# Patient Record
Sex: MALE | Race: BLACK OR AFRICAN AMERICAN | Employment: OTHER | URBAN - METROPOLITAN AREA
[De-identification: names, ages, dates, MRNs, and addresses within clinical notes are randomized per-mention and may not be internally consistent; named-entity substitution may affect disease eponyms.]

---

## 2021-10-11 ENCOUNTER — APPOINTMENT (OUTPATIENT)
Dept: GENERAL RADIOLOGY | Facility: CLINIC | Age: 76
DRG: 261 | End: 2021-10-11
Payer: MEDICARE

## 2021-10-11 ENCOUNTER — APPOINTMENT (OUTPATIENT)
Dept: CT IMAGING | Facility: CLINIC | Age: 76
DRG: 261 | End: 2021-10-11
Payer: MEDICARE

## 2021-10-11 ENCOUNTER — HOSPITAL ENCOUNTER (INPATIENT)
Age: 76
LOS: 2 days | Discharge: HOME | DRG: 261 | End: 2021-10-13
Attending: EMERGENCY MEDICINE | Admitting: INTERNAL MEDICINE
Payer: MEDICARE

## 2021-10-11 DIAGNOSIS — R00.1 BRADYCARDIA: ICD-10-CM

## 2021-10-11 DIAGNOSIS — R55 SYNCOPE AND COLLAPSE: Primary | ICD-10-CM

## 2021-10-11 PROBLEM — E78.5 HYPERLIPEMIA: Status: ACTIVE | Noted: 2021-10-11

## 2021-10-11 PROBLEM — I10 HYPERTENSION: Status: ACTIVE | Noted: 2021-10-11

## 2021-10-11 LAB
ABSOLUTE EOS #: 0.2 K/UL (ref 0–0.4)
ABSOLUTE IMMATURE GRANULOCYTE: ABNORMAL K/UL (ref 0–0.3)
ABSOLUTE LYMPH #: 2.8 K/UL (ref 1–4.8)
ABSOLUTE MONO #: 0.6 K/UL (ref 0.1–1.2)
ALBUMIN SERPL-MCNC: 4 G/DL (ref 3.5–5.2)
ALBUMIN/GLOBULIN RATIO: 1.3 (ref 1–2.5)
ALP BLD-CCNC: 62 U/L (ref 40–129)
ALT SERPL-CCNC: 10 U/L (ref 5–41)
AMYLASE: 88 U/L (ref 28–100)
ANION GAP SERPL CALCULATED.3IONS-SCNC: 11 MMOL/L (ref 9–17)
AST SERPL-CCNC: 15 U/L
BASOPHILS # BLD: 1 % (ref 0–2)
BASOPHILS ABSOLUTE: 0 K/UL (ref 0–0.2)
BILIRUB SERPL-MCNC: 0.4 MG/DL (ref 0.3–1.2)
BILIRUBIN URINE: NEGATIVE
BNP INTERPRETATION: NORMAL
BUN BLDV-MCNC: 14 MG/DL (ref 8–23)
BUN/CREAT BLD: ABNORMAL (ref 9–20)
CALCIUM SERPL-MCNC: 9.2 MG/DL (ref 8.6–10.4)
CHLORIDE BLD-SCNC: 104 MMOL/L (ref 98–107)
CO2: 26 MMOL/L (ref 20–31)
COLOR: YELLOW
COMMENT UA: NORMAL
CREAT SERPL-MCNC: 1.2 MG/DL (ref 0.7–1.2)
DIFFERENTIAL TYPE: ABNORMAL
EKG ATRIAL RATE: 62 BPM
EKG P AXIS: 71 DEGREES
EKG P-R INTERVAL: 186 MS
EKG Q-T INTERVAL: 442 MS
EKG QRS DURATION: 78 MS
EKG QTC CALCULATION (BAZETT): 448 MS
EKG R AXIS: 41 DEGREES
EKG T AXIS: 49 DEGREES
EKG VENTRICULAR RATE: 62 BPM
EOSINOPHILS RELATIVE PERCENT: 4 % (ref 1–4)
GFR AFRICAN AMERICAN: >60 ML/MIN
GFR NON-AFRICAN AMERICAN: 59 ML/MIN
GFR SERPL CREATININE-BSD FRML MDRD: ABNORMAL ML/MIN/{1.73_M2}
GFR SERPL CREATININE-BSD FRML MDRD: ABNORMAL ML/MIN/{1.73_M2}
GLUCOSE BLD-MCNC: 139 MG/DL (ref 70–99)
GLUCOSE URINE: NEGATIVE
HCT VFR BLD CALC: 47.4 % (ref 41–53)
HEMOGLOBIN: 15.2 G/DL (ref 13.5–17.5)
IMMATURE GRANULOCYTES: ABNORMAL %
INR BLD: 1
KETONES, URINE: NEGATIVE
LEUKOCYTE ESTERASE, URINE: NEGATIVE
LIPASE: 25 U/L (ref 13–60)
LYMPHOCYTES # BLD: 49 % (ref 24–44)
MCH RBC QN AUTO: 26.5 PG (ref 26–34)
MCHC RBC AUTO-ENTMCNC: 32.1 G/DL (ref 31–37)
MCV RBC AUTO: 82.7 FL (ref 80–100)
MONOCYTES # BLD: 10 % (ref 2–11)
NITRITE, URINE: NEGATIVE
NRBC AUTOMATED: ABNORMAL PER 100 WBC
PARTIAL THROMBOPLASTIN TIME: 21.3 SEC (ref 21.3–31.3)
PDW BLD-RTO: 14.2 % (ref 12.5–15.4)
PH UA: 6 (ref 5–8)
PLATELET # BLD: 195 K/UL (ref 140–450)
PLATELET ESTIMATE: ABNORMAL
PMV BLD AUTO: 7.6 FL (ref 6–12)
POTASSIUM SERPL-SCNC: 3.7 MMOL/L (ref 3.7–5.3)
PRO-BNP: 112 PG/ML
PROTEIN UA: NEGATIVE
PROTHROMBIN TIME: 10.4 SEC (ref 9.4–12.6)
RBC # BLD: 5.74 M/UL (ref 4.5–5.9)
RBC # BLD: ABNORMAL 10*6/UL
SEG NEUTROPHILS: 36 % (ref 36–66)
SEGMENTED NEUTROPHILS ABSOLUTE COUNT: 2.1 K/UL (ref 1.8–7.7)
SODIUM BLD-SCNC: 141 MMOL/L (ref 135–144)
SPECIFIC GRAVITY UA: 1.02 (ref 1–1.03)
TOTAL PROTEIN: 7.1 G/DL (ref 6.4–8.3)
TROPONIN INTERP: NORMAL
TROPONIN INTERP: NORMAL
TROPONIN T: NORMAL NG/ML
TROPONIN T: NORMAL NG/ML
TROPONIN, HIGH SENSITIVITY: 11 NG/L (ref 0–22)
TROPONIN, HIGH SENSITIVITY: 13 NG/L (ref 0–22)
TURBIDITY: CLEAR
URINE HGB: NEGATIVE
UROBILINOGEN, URINE: NORMAL
WBC # BLD: 5.7 K/UL (ref 3.5–11)
WBC # BLD: ABNORMAL 10*3/UL

## 2021-10-11 PROCEDURE — 99285 EMERGENCY DEPT VISIT HI MDM: CPT

## 2021-10-11 PROCEDURE — 83690 ASSAY OF LIPASE: CPT

## 2021-10-11 PROCEDURE — 6360000002 HC RX W HCPCS: Performed by: EMERGENCY MEDICINE

## 2021-10-11 PROCEDURE — 96374 THER/PROPH/DIAG INJ IV PUSH: CPT

## 2021-10-11 PROCEDURE — 82150 ASSAY OF AMYLASE: CPT

## 2021-10-11 PROCEDURE — 80053 COMPREHEN METABOLIC PANEL: CPT

## 2021-10-11 PROCEDURE — 6370000000 HC RX 637 (ALT 250 FOR IP): Performed by: NURSE PRACTITIONER

## 2021-10-11 PROCEDURE — 84484 ASSAY OF TROPONIN QUANT: CPT

## 2021-10-11 PROCEDURE — 85025 COMPLETE CBC W/AUTO DIFF WBC: CPT

## 2021-10-11 PROCEDURE — 36415 COLL VENOUS BLD VENIPUNCTURE: CPT

## 2021-10-11 PROCEDURE — APPSS45 APP SPLIT SHARED TIME 31-45 MINUTES: Performed by: NURSE PRACTITIONER

## 2021-10-11 PROCEDURE — 99222 1ST HOSP IP/OBS MODERATE 55: CPT | Performed by: NURSE PRACTITIONER

## 2021-10-11 PROCEDURE — 70450 CT HEAD/BRAIN W/O DYE: CPT

## 2021-10-11 PROCEDURE — 85730 THROMBOPLASTIN TIME PARTIAL: CPT

## 2021-10-11 PROCEDURE — 71045 X-RAY EXAM CHEST 1 VIEW: CPT

## 2021-10-11 PROCEDURE — 85610 PROTHROMBIN TIME: CPT

## 2021-10-11 PROCEDURE — 93005 ELECTROCARDIOGRAM TRACING: CPT | Performed by: EMERGENCY MEDICINE

## 2021-10-11 PROCEDURE — 6360000002 HC RX W HCPCS: Performed by: NURSE PRACTITIONER

## 2021-10-11 PROCEDURE — 2060000000 HC ICU INTERMEDIATE R&B

## 2021-10-11 PROCEDURE — 81003 URINALYSIS AUTO W/O SCOPE: CPT

## 2021-10-11 PROCEDURE — 83880 ASSAY OF NATRIURETIC PEPTIDE: CPT

## 2021-10-11 PROCEDURE — 2580000003 HC RX 258: Performed by: NURSE PRACTITIONER

## 2021-10-11 RX ORDER — ATORVASTATIN CALCIUM 20 MG/1
20 TABLET, FILM COATED ORAL DAILY
Status: DISCONTINUED | OUTPATIENT
Start: 2021-10-11 | End: 2021-10-13 | Stop reason: HOSPADM

## 2021-10-11 RX ORDER — POTASSIUM CHLORIDE 7.45 MG/ML
10 INJECTION INTRAVENOUS PRN
Status: DISCONTINUED | OUTPATIENT
Start: 2021-10-11 | End: 2021-10-13 | Stop reason: HOSPADM

## 2021-10-11 RX ORDER — SIMVASTATIN 40 MG
40 TABLET ORAL NIGHTLY
COMMUNITY

## 2021-10-11 RX ORDER — SILDENAFIL 100 MG/1
100 TABLET, FILM COATED ORAL PRN
Status: ON HOLD | COMMUNITY
End: 2021-10-13 | Stop reason: HOSPADM

## 2021-10-11 RX ORDER — ONDANSETRON 4 MG/1
4 TABLET, ORALLY DISINTEGRATING ORAL EVERY 8 HOURS PRN
Status: DISCONTINUED | OUTPATIENT
Start: 2021-10-11 | End: 2021-10-13 | Stop reason: HOSPADM

## 2021-10-11 RX ORDER — LISINOPRIL 40 MG/1
40 TABLET ORAL DAILY
Status: DISCONTINUED | OUTPATIENT
Start: 2021-10-11 | End: 2021-10-13 | Stop reason: HOSPADM

## 2021-10-11 RX ORDER — SODIUM CHLORIDE 9 MG/ML
INJECTION, SOLUTION INTRAVENOUS CONTINUOUS
Status: DISCONTINUED | OUTPATIENT
Start: 2021-10-11 | End: 2021-10-11

## 2021-10-11 RX ORDER — SODIUM CHLORIDE 0.9 % (FLUSH) 0.9 %
10 SYRINGE (ML) INJECTION PRN
Status: DISCONTINUED | OUTPATIENT
Start: 2021-10-11 | End: 2021-10-13 | Stop reason: HOSPADM

## 2021-10-11 RX ORDER — CARVEDILOL 25 MG/1
25 TABLET ORAL 2 TIMES DAILY WITH MEALS
Status: DISCONTINUED | OUTPATIENT
Start: 2021-10-11 | End: 2021-10-13 | Stop reason: HOSPADM

## 2021-10-11 RX ORDER — TRAMADOL HYDROCHLORIDE 50 MG/1
50 TABLET ORAL ONCE
Status: COMPLETED | OUTPATIENT
Start: 2021-10-11 | End: 2021-10-11

## 2021-10-11 RX ORDER — LANOLIN ALCOHOL/MO/W.PET/CERES
1000 CREAM (GRAM) TOPICAL DAILY
COMMUNITY

## 2021-10-11 RX ORDER — POTASSIUM CHLORIDE 20 MEQ/1
40 TABLET, EXTENDED RELEASE ORAL PRN
Status: DISCONTINUED | OUTPATIENT
Start: 2021-10-11 | End: 2021-10-13 | Stop reason: HOSPADM

## 2021-10-11 RX ORDER — ONDANSETRON 2 MG/ML
4 INJECTION INTRAMUSCULAR; INTRAVENOUS ONCE
Status: COMPLETED | OUTPATIENT
Start: 2021-10-11 | End: 2021-10-11

## 2021-10-11 RX ORDER — CARVEDILOL 25 MG/1
25 TABLET ORAL 2 TIMES DAILY WITH MEALS
Status: ON HOLD | COMMUNITY
End: 2021-10-13 | Stop reason: HOSPADM

## 2021-10-11 RX ORDER — ACETAMINOPHEN 325 MG/1
650 TABLET ORAL EVERY 6 HOURS PRN
Status: DISCONTINUED | OUTPATIENT
Start: 2021-10-11 | End: 2021-10-13 | Stop reason: HOSPADM

## 2021-10-11 RX ORDER — ONDANSETRON 2 MG/ML
4 INJECTION INTRAMUSCULAR; INTRAVENOUS EVERY 6 HOURS PRN
Status: DISCONTINUED | OUTPATIENT
Start: 2021-10-11 | End: 2021-10-13 | Stop reason: HOSPADM

## 2021-10-11 RX ORDER — LISINOPRIL 40 MG/1
40 TABLET ORAL DAILY
COMMUNITY

## 2021-10-11 RX ORDER — SODIUM CHLORIDE 9 MG/ML
25 INJECTION, SOLUTION INTRAVENOUS PRN
Status: DISCONTINUED | OUTPATIENT
Start: 2021-10-11 | End: 2021-10-13 | Stop reason: HOSPADM

## 2021-10-11 RX ORDER — ACETAMINOPHEN 650 MG/1
650 SUPPOSITORY RECTAL EVERY 6 HOURS PRN
Status: DISCONTINUED | OUTPATIENT
Start: 2021-10-11 | End: 2021-10-13 | Stop reason: HOSPADM

## 2021-10-11 RX ORDER — SODIUM CHLORIDE 0.9 % (FLUSH) 0.9 %
5-40 SYRINGE (ML) INJECTION EVERY 12 HOURS SCHEDULED
Status: DISCONTINUED | OUTPATIENT
Start: 2021-10-11 | End: 2021-10-13 | Stop reason: HOSPADM

## 2021-10-11 RX ORDER — NICOTINE 21 MG/24HR
1 PATCH, TRANSDERMAL 24 HOURS TRANSDERMAL DAILY
Status: DISCONTINUED | OUTPATIENT
Start: 2021-10-11 | End: 2021-10-13 | Stop reason: HOSPADM

## 2021-10-11 RX ORDER — MAGNESIUM SULFATE 1 G/100ML
1000 INJECTION INTRAVENOUS PRN
Status: DISCONTINUED | OUTPATIENT
Start: 2021-10-11 | End: 2021-10-13 | Stop reason: HOSPADM

## 2021-10-11 RX ORDER — POLYETHYLENE GLYCOL 3350 17 G/17G
17 POWDER, FOR SOLUTION ORAL DAILY PRN
Status: DISCONTINUED | OUTPATIENT
Start: 2021-10-11 | End: 2021-10-13 | Stop reason: HOSPADM

## 2021-10-11 RX ADMIN — ONDANSETRON 4 MG: 2 INJECTION INTRAMUSCULAR; INTRAVENOUS at 05:27

## 2021-10-11 RX ADMIN — LISINOPRIL 40 MG: 40 TABLET ORAL at 15:39

## 2021-10-11 RX ADMIN — ENOXAPARIN SODIUM 40 MG: 40 INJECTION SUBCUTANEOUS at 13:52

## 2021-10-11 RX ADMIN — TRAMADOL HYDROCHLORIDE 50 MG: 50 TABLET ORAL at 22:33

## 2021-10-11 RX ADMIN — ATORVASTATIN CALCIUM 20 MG: 20 TABLET, FILM COATED ORAL at 15:39

## 2021-10-11 RX ADMIN — ACETAMINOPHEN 650 MG: 325 TABLET ORAL at 19:10

## 2021-10-11 RX ADMIN — SODIUM CHLORIDE, PRESERVATIVE FREE 10 ML: 5 INJECTION INTRAVENOUS at 20:17

## 2021-10-11 ASSESSMENT — ENCOUNTER SYMPTOMS
NAUSEA: 1
DIARRHEA: 0
EYE REDNESS: 0
SORE THROAT: 0
COUGH: 0
WHEEZING: 0
ABDOMINAL PAIN: 0
SHORTNESS OF BREATH: 0
EYE DISCHARGE: 0
EYE PAIN: 0
ABDOMINAL DISTENTION: 0
COLOR CHANGE: 0
SINUS PRESSURE: 0
PHOTOPHOBIA: 0
VOMITING: 0
SINUS PAIN: 0
STRIDOR: 0
CONSTIPATION: 0

## 2021-10-11 ASSESSMENT — PAIN SCALES - GENERAL
PAINLEVEL_OUTOF10: 0
PAINLEVEL_OUTOF10: 6
PAINLEVEL_OUTOF10: 6
PAINLEVEL_OUTOF10: 0

## 2021-10-11 NOTE — ED NOTES
Maynard Oppenheim NP return page, speaking with Dr. Mirella Sanchez.        Kandy Patel RN  10/11/21 8807

## 2021-10-11 NOTE — PROGRESS NOTES
Patient admitted to floor via stretcher from [facility] ER. Patient alert and oriented, but seems somewhat confused. Pt. is able to state year, President,t and month, but hard focusing on what meds he is on and what happened prior to his admission. He states that his wife Wendy Andre handles most of his affairs. Patient came with no belongings other than his gown. Patient was in [facility] and after became drinking alcoholically. Approx. 11/2 years ago started AA and has been sober since. He smokes a pack a cigarettes per day. He has no cough and lungs are clear. Patient in sinus lyn on monitor and currently asymptomatic with sats at 99 on room air. Patient does see the [place] for all medical needs and recently had testing done for his increase confusion with a CT scan that was normal and was to f/u in [place] for an MRI. Patient wife, Wendy Andre at bedside. Her number is [phone]  Wife states Pt.  Has a history of an MI with stent  approx 12 years ago

## 2021-10-11 NOTE — H&P
[facility]  Office: [address], DO, Zandra Edmondson, DO, Gladis Seay, DO, [facility], DO, Armando Roa MD, Amaris Lr MD, Nadir Mendez MD, Jana Milton MD, Jessica Hung MD, Viv Ray MD, Meri Hansen MD, Gabriel Irvin, DO, Anne Garza, DO, Wendy Lopez MD,  James Dawn DO, Aidee Acosta MD, Masood Delaney MD, Luis Fernando Minaya MD, Greyson Rand MD, Laron Flores MD, Lois Gunderson MD, [facility], [facility] Noem[facility], CNP, Jess Chamberlain, CNP, Lizeth Gutierrez, CNS, Crystal Suero, CNP, Rhona Roberts, CNP, Shari Morris, CNP, Macrina Capone, CNP, Anna Mccloud, CNP, Abhishek Perez PA-C, Jermaine Carpio, [facility], Nuha Eason, CNP, Jenifer Leroy, CNP, Alena Sunshine, CNP, Yosi Collpatric, CNP, Hayley Damian, CNP, Horacio Stockton, CNP, Rosalie Avery, [facility] 97    HISTORY AND PHYSICAL EXAMINATION            Date:   10/11/2021  Patient name:  Chuy Acevedo  Date of admission:  10/11/2021  4:46 AM  MRN:   [id number]  Account:  [de-identified]  YOB: 1945  PCP:    No primary care provider on file. Room:   [address] [place] [address]  Code Status:    Full Code    Chief Complaint:     Chief Complaint   Patient presents with    Dizziness       History Obtained From:     patient    History of Present Illness:     Chuy Acevedo is a 78 y.o. Non- / non  male who presents with Dizziness   and is admitted to the hospital for the management of Syncope, cardiogenic. Patient reported that over the past 48 hours she has been having a \"empty\" feeling in his chest multiple times has been sitting or lying down and will suddenly become very dizzy and passed out. Patient reports she has never had any symptoms like this before and they have not happened unprovoked. Interview with patient reveals that he has a personal history of hypertension hyperlipidemia with coronary artery disease.   Patient reports that he takes very few medications and is relatively healthy. Patient denies any recent illness or symptomology of any kind. Up until his first syncope episode he had no medical concerns. Patient reported to the emergency department where extensive evaluation was completed. Patient's lab work is unremarkable telemetry monitoring did not capture multiple episodes of profound bradycardia with ventricular escape beats and bigeminy. Patient is admitted for cardiogenic syncope    Past Medical History:     Past Medical History:   Diagnosis Date    CAD (coronary artery disease)     MI with stent approx 12 years        Past Surgical History:     History reviewed. No pertinent surgical history. Medications Prior to Admission:     Prior to Admission medications    Medication Sig Start Date End Date Taking? Authorizing Provider   carvedilol (COREG) 25 MG tablet Take 25 mg by mouth 2 times daily (with meals)   Yes Historical Provider, MD   lisinopril (PRINIVIL;ZESTRIL) 40 MG tablet Take 40 mg by mouth daily   Yes Historical Provider, MD   simvastatin (ZOCOR) 40 MG tablet Take 40 mg by mouth nightly   Yes Historical Provider, MD   sildenafil (VIAGRA) 100 MG tablet Take 100 mg by mouth as needed for Erectile Dysfunction   Yes Historical Provider, MD   vitamin B-12 (CYANOCOBALAMIN) 1000 MCG tablet Take 1,000 mcg by mouth daily   Yes Historical Provider, MD   vitamin D (CHOLECALCIFEROL) 25 MCG (1000 UT) TABS tablet Take 1,000 Units by mouth daily   Yes Historical Provider, MD        Allergies:     Patient has no known allergies. Social History:     Tobacco:    reports that he has been smoking cigarettes. He has been smoking about 1.00 pack per day. He uses smokeless tobacco.  Alcohol:      reports previous alcohol use. Drug Use:  reports no history of drug use. Family History:     History reviewed. No pertinent family history. Review of Systems:     Positive and Negative as described in HPI.     Review of Systems Constitutional: Negative for fever and unexpected weight change. HENT: Negative for sinus pressure and sinus pain. Eyes: Negative for photophobia and visual disturbance. Respiratory: Negative for shortness of breath and wheezing. Cardiovascular: Negative for chest pain and palpitations. Gastrointestinal: Negative for abdominal distention and abdominal pain. Endocrine: Negative for cold intolerance and heat intolerance. Genitourinary: Negative for difficulty urinating and urgency. Musculoskeletal: Negative for arthralgias and myalgias. Skin: Negative for color change, pallor and rash. Allergic/Immunologic: Negative for environmental allergies and immunocompromised state. Neurological: Positive for syncope and weakness. Psychiatric/Behavioral: Negative for agitation, confusion, self-injury and suicidal ideas. The patient is not nervous/anxious. Physical Exam:   /70   Pulse 52   Temp 98.1 °F (36.7 °C) (Oral)   Resp 16   Ht 6' 3\" (1.905 m)   Wt 184 lb 11.2 oz (83.8 kg)   SpO2 98%   BMI 23.09 kg/m²   Temp (24hrs), Av.3 °F (36.8 °C), Min:98.1 °F (36.7 °C), Max:98.5 °F (36.9 °C)    No results for input(s): POCGLU in the last 72 hours. Intake/Output Summary (Last 24 hours) at 10/11/2021 1445  Last data filed at 10/11/2021 1332  Gross per 24 hour   Intake 240 ml   Output 500 ml   Net -260 ml       Physical Exam  Constitutional:       Appearance: Normal appearance. He is normal weight. HENT:      Head: Normocephalic and atraumatic. Mouth/Throat:      Mouth: Mucous membranes are moist.   Eyes:      Extraocular Movements: Extraocular movements intact. Pupils: Pupils are equal, round, and reactive to light. Cardiovascular:      Rate and Rhythm: Bradycardia present. Rhythm irregular. Heart sounds: Murmur heard. Pulmonary:      Effort: Pulmonary effort is normal. No respiratory distress. Breath sounds: Normal breath sounds.    Abdominal: General: Abdomen is flat. Bowel sounds are normal. There is no distension. Palpations: Abdomen is soft. Tenderness: There is no abdominal tenderness. Genitourinary:     Penis: Normal.    Musculoskeletal:         General: Tenderness present. No swelling or deformity. Cervical back: Normal range of motion and neck supple. No rigidity or tenderness. Skin:     Capillary Refill: Capillary refill takes 2 to 3 seconds. Coloration: Skin is not jaundiced. Findings: No bruising or lesion. Neurological:      General: No focal deficit present. Mental Status: He is alert and oriented to person, place, and time. Cranial Nerves: No cranial nerve deficit. Motor: No weakness.    Psychiatric:         Mood and Affect: Mood normal.         Behavior: Behavior normal.         Investigations:      Laboratory Testing:  Recent Results (from the past 24 hour(s))   EKG 12 Lead    Collection Time: 10/11/21  4:49 AM   Result Value Ref Range    Ventricular Rate 62 BPM    Atrial Rate 62 BPM    P-R Interval 186 ms    QRS Duration 78 ms    Q-T Interval 442 ms    QTc Calculation (Bazett) 448 ms    P Axis 71 degrees    R Axis 41 degrees    T Axis 49 degrees   CBC Auto Differential    Collection Time: 10/11/21  4:50 AM   Result Value Ref Range    WBC 5.7 3.5 - 11.0 k/uL    RBC 5.74 4.5 - 5.9 m/uL    Hemoglobin 15.2 13.5 - 17.5 g/dL    Hematocrit 47.4 41 - 53 %    MCV 82.7 80 - 100 fL    MCH 26.5 26 - 34 pg    MCHC 32.1 31 - 37 g/dL    RDW 14.2 12.5 - 15.4 %    Platelets 570 529 - 134 k/uL    MPV 7.6 6.0 - 12.0 fL    NRBC Automated NOT REPORTED per 100 WBC    Differential Type NOT REPORTED     Seg Neutrophils 36 36 - 66 %    Lymphocytes 49 (H) 24 - 44 %    Monocytes 10 2 - 11 %    Eosinophils % 4 1 - 4 %    Basophils 1 0 - 2 %    Immature Granulocytes NOT REPORTED 0 %    Segs Absolute 2.10 1.8 - 7.7 k/uL    Absolute Lymph # 2.80 1.0 - 4.8 k/uL    Absolute Mono # 0.60 0.1 - 1.2 k/uL    Absolute Eos # 0.20 0.0 - 0.4 k/uL    Basophils Absolute 0.00 0.0 - 0.2 k/uL    Absolute Immature Granulocyte NOT REPORTED 0.00 - 0.30 k/uL    WBC Morphology NOT REPORTED     RBC Morphology NOT REPORTED     Platelet Estimate NOT REPORTED    Comprehensive Metabolic Panel w/ Reflex to MG    Collection Time: 10/11/21  4:50 AM   Result Value Ref Range    Glucose 139 (H) 70 - 99 mg/dL    BUN 14 8 - 23 mg/dL    CREATININE 1.20 0.70 - 1.20 mg/dL    Bun/Cre Ratio NOT REPORTED 9 - 20    Calcium 9.2 8.6 - 10.4 mg/dL    Sodium 141 135 - 144 mmol/L    Potassium 3.7 3.7 - 5.3 mmol/L    Chloride 104 98 - 107 mmol/L    CO2 26 20 - 31 mmol/L    Anion Gap 11 9 - 17 mmol/L    Alkaline Phosphatase 62 40 - 129 U/L    ALT 10 5 - 41 U/L    AST 15 <40 U/L    Total Bilirubin 0.40 0.3 - 1.2 mg/dL    Total Protein 7.1 6.4 - 8.3 g/dL    Albumin 4.0 3.5 - 5.2 g/dL    Albumin/Globulin Ratio 1.3 1.0 - 2.5    GFR Non- 59 (L) >60 mL/min    GFR African American >60 >60 mL/min    GFR Comment          GFR Staging NOT REPORTED    Lipase    Collection Time: 10/11/21  4:50 AM   Result Value Ref Range    Lipase 25 13 - 60 U/L   Amylase    Collection Time: 10/11/21  4:50 AM   Result Value Ref Range    Amylase 88 28 - 100 U/L   Troponin    Collection Time: 10/11/21  4:50 AM   Result Value Ref Range    Troponin, High Sensitivity 11 0 - 22 ng/L    Troponin T NOT REPORTED <0.03 ng/mL    Troponin Interp NOT REPORTED    Brain Natriuretic Peptide    Collection Time: 10/11/21  4:50 AM   Result Value Ref Range    Pro- <300 pg/mL    BNP Interpretation Pro-BNP Reference Range:    Protime-INR    Collection Time: 10/11/21  4:50 AM   Result Value Ref Range    Protime 10.4 9.4 - 12.6 sec    INR 1.0    APTT    Collection Time: 10/11/21  4:50 AM   Result Value Ref Range    PTT 21.3 21.3 - 31.3 sec   Troponin    Collection Time: 10/11/21  7:25 AM   Result Value Ref Range    Troponin, High Sensitivity 13 0 - 22 ng/L    Troponin T NOT REPORTED <0.03 ng/mL    Troponin Interp NOT REPORTED    Urinalysis Reflex to Culture    Collection Time: 10/11/21 10:32 AM    Specimen: Urine, clean catch   Result Value Ref Range    Color, UA Yellow Yellow    Turbidity UA Clear Clear    Glucose, Ur NEGATIVE NEGATIVE    Bilirubin Urine NEGATIVE NEGATIVE    Ketones, Urine NEGATIVE NEGATIVE    Specific Gravity, UA 1.020 1.005 - 1.030    Urine Hgb NEGATIVE NEGATIVE    pH, UA 6.0 5.0 - 8.0    Protein, UA NEGATIVE NEGATIVE    Urobilinogen, Urine Normal Normal    Nitrite, Urine NEGATIVE NEGATIVE    Leukocyte Esterase, Urine NEGATIVE NEGATIVE    Urinalysis Comments       Microscopic exam not performed based on chemical results unless requested in original order. Urinalysis Comments          Urinalysis Comments       Utilizing a urinalysis as the only screening method to exclude a potential uropathogen can be unreliable in many patient populations. Rapid screening tests are less sensitive than culture and if UTI is a clinical possibility, culture should be considered despite a negative urinalysis. Imaging/Diagnostics:  CT Head WO Contrast    Result Date: 10/11/2021  No acute intracranial abnormality. XR CHEST PORTABLE    Result Date: 10/11/2021  Normal examination. Assessment :      Hospital Problems         Last Modified POA    * (Principal) Syncope, cardiogenic 10/11/2021 Yes    CAD (coronary artery disease) 10/11/2021 Yes    Overview Signed 10/11/2021  2:44 PM by SHOAIB Rodrigez NP     MI with stent approx 12 years         Hyperlipemia 10/11/2021 Yes    Hypertension 10/11/2021 Yes          Plan:     Patient status inpatient in the  Progressive Unit/Step down    1. Cardiogenic syncope secondary to bradycardia with a personal history of coronary disease, hyperlipidemia, hypertension  1. Rate well controlled at this time, no indication for acute intervention  2. Cardiac echo  3.  Radiology consultation for evaluation and diagnostic recommendations      Consultations:   IP CONSULT TO SOCIAL WORK  IP CONSULT TO CARDIOLOGY    Patient is admitted as inpatient status because of co-morbidities listed above, severity of signs and symptoms as outlined, requirement for current medical therapies and most importantly because of direct risk to patient if care not provided in a hospital setting. Expected length of stay > 48 hours. SHOAIB Rothman NP  10/11/2021  2:45 PM    Copy sent to Dr. Borrego [place] primary care provider on file.

## 2021-10-11 NOTE — PROGRESS NOTES
Transitions of Care Pharmacy Service   Medication Review    The patient's list of current home medications has been reviewed. Source(s) of information: patient's spouse, personal med list from [address]      Please feel free to call me with any questions about this encounter. Thank you.     Anne Stevens [facility]   Transitions of Care Pharmacy Service  Phone:  [phone]  Fax: [phone]      Electronically signed by Anne Stevens [facility] on 10/11/2021 at 5:51 PM           Medications Prior to Admission:   carvedilol (COREG) 25 MG tablet, Take 25 mg by mouth 2 times daily (with meals)  lisinopril (PRINIVIL;ZESTRIL) 40 MG tablet, Take 40 mg by mouth daily  simvastatin (ZOCOR) 40 MG tablet, Take 40 mg by mouth nightly  sildenafil (VIAGRA) 100 MG tablet, Take 100 mg by mouth as needed for Erectile Dysfunction  vitamin B-12 (CYANOCOBALAMIN) 1000 MCG tablet, Take 1,000 mcg by mouth daily  vitamin D (CHOLECALCIFEROL) 25 MCG (1000 UT) TABS tablet, Take 1,000 Units by mouth daily

## 2021-10-11 NOTE — PROGRESS NOTES
Pt. Complaining of right side jaw pain. Daughter Sarah Plate in room and said he was to get it looked at the [place]. In examination it appears to be a tooth type pain.  Pt missing a upper molar and has old metal fillings in remaining teeth

## 2021-10-11 NOTE — ED PROVIDER NOTES
[facility] ED  [address]  Phone: [phone]        ADDENDUM:      Care of this patient was assumed from Dr. Damaris Nassar. The patient was seen for Dizziness  . The patient's initial evaluation and plan have been discussed with the prior provider who initially evaluated the patient. Nursing Notes, Past Medical Hx, Past Surgical Hx, Allergies, were all reviewed. PAST MEDICAL HISTORY    has no past medical history on file. SURGICAL HISTORY      has no past surgical history on file. CURRENT MEDICATIONS       Previous Medications    No medications on file       ALLERGIES     has No Known Allergies.       Diagnostic Results         LABS:   Results for orders placed or performed during the hospital encounter of 10/11/21   CBC Auto Differential   Result Value Ref Range    WBC 5.7 3.5 - 11.0 k/uL    RBC 5.74 4.5 - 5.9 m/uL    Hemoglobin 15.2 13.5 - 17.5 g/dL    Hematocrit 47.4 41 - 53 %    MCV 82.7 80 - 100 fL    MCH 26.5 26 - 34 pg    MCHC 32.1 31 - 37 g/dL    RDW 14.2 12.5 - 15.4 %    Platelets 410 458 - 385 k/uL    MPV 7.6 6.0 - 12.0 fL    NRBC Automated NOT REPORTED per 100 WBC    Differential Type NOT REPORTED     Seg Neutrophils 36 36 - 66 %    Lymphocytes 49 (H) 24 - 44 %    Monocytes 10 2 - 11 %    Eosinophils % 4 1 - 4 %    Basophils 1 0 - 2 %    Immature Granulocytes NOT REPORTED 0 %    Segs Absolute 2.10 1.8 - 7.7 k/uL    Absolute Lymph # 2.80 1.0 - 4.8 k/uL    Absolute Mono # 0.60 0.1 - 1.2 k/uL    Absolute Eos # 0.20 0.0 - 0.4 k/uL    Basophils Absolute 0.00 0.0 - 0.2 k/uL    Absolute Immature Granulocyte NOT REPORTED 0.00 - 0.30 k/uL    WBC Morphology NOT REPORTED     RBC Morphology NOT REPORTED     Platelet Estimate NOT REPORTED    Comprehensive Metabolic Panel w/ Reflex to MG   Result Value Ref Range    Glucose 139 (H) 70 - 99 mg/dL    BUN 14 8 - 23 mg/dL    CREATININE 1.20 0.70 - 1.20 mg/dL    Bun/Cre Ratio NOT REPORTED 9 - 20    Calcium 9.2 8.6 - 10.4 mg/dL Sodium 141 135 - 144 mmol/L    Potassium 3.7 3.7 - 5.3 mmol/L    Chloride 104 98 - 107 mmol/L    CO2 26 20 - 31 mmol/L    Anion Gap 11 9 - 17 mmol/L    Alkaline Phosphatase 62 40 - 129 U/L    ALT 10 5 - 41 U/L    AST 15 <40 U/L    Total Bilirubin 0.40 0.3 - 1.2 mg/dL    Total Protein 7.1 6.4 - 8.3 g/dL    Albumin 4.0 3.5 - 5.2 g/dL    Albumin/Globulin Ratio 1.3 1.0 - 2.5    GFR Non- 59 (L) >60 mL/min    GFR African American >60 >60 mL/min    GFR Comment          GFR Staging NOT REPORTED    Lipase   Result Value Ref Range    Lipase 25 13 - 60 U/L   Amylase   Result Value Ref Range    Amylase 88 28 - 100 U/L   Troponin   Result Value Ref Range    Troponin, High Sensitivity 11 0 - 22 ng/L    Troponin T NOT REPORTED <0.03 ng/mL    Troponin Interp NOT REPORTED    Brain Natriuretic Peptide   Result Value Ref Range    Pro- <300 pg/mL    BNP Interpretation Pro-BNP Reference Range:    Protime-INR   Result Value Ref Range    Protime 10.4 9.4 - 12.6 sec    INR 1.0    APTT   Result Value Ref Range    PTT 21.3 21.3 - 31.3 sec   Troponin   Result Value Ref Range    Troponin, High Sensitivity 13 0 - 22 ng/L    Troponin T NOT REPORTED <0.03 ng/mL    Troponin Interp NOT REPORTED    EKG 12 Lead   Result Value Ref Range    Ventricular Rate 62 BPM    Atrial Rate 62 BPM    P-R Interval 186 ms    QRS Duration 78 ms    Q-T Interval 442 ms    QTc Calculation (Bazett) 448 ms    P Axis 71 degrees    R Axis 41 degrees    T Axis 49 degrees       RADIOLOGY:  CT Head WO Contrast   Final Result   No acute intracranial abnormality. XR CHEST PORTABLE   Final Result   Normal examination.              RECENT VITALS:  BP: (!) 156/74, Temp: 98.5 °F (36.9 °C), Pulse: 51, Resp: 16     ED Course     The patient was given the following medications:  Orders Placed This Encounter   Medications    ondansetron (ZOFRAN) injection 4 mg       Medical Decision Making      ED Course as of Oct 11 1102   Mon Oct 11, 2021   9120 Patient second troponin normal, however, he was having some bigeminy here in the emergency department. At this time we are going to admit for further evaluation and treatment    [TS]   0846 Patient's work-up did not reveal any significant abnormalities or changes, however, he did have runs of bigeminy as well as bradycardia with heart rate in the 40s, continue to have some dizziness and at this time I do think he needs to be admitted for further evaluation and treatment. Patient amenable to the plan will contact [facility] hospitalist    [TS]   RORY Chavarria 16 accepting for Dr. Kayode Archer.     [TS]      ED Course User Index  [TS] Lia Mora DO           EMERGENCY DEPARTMENT COURSE:   Vitals:    Vitals:    10/11/21 0446 10/11/21 0448 10/11/21 0715 10/11/21 0810   BP:  (!) 163/84 (!) 149/71 (!) 156/74   Pulse: 62  58 51   Resp: 16  16 16   Temp: 98.5 °F (36.9 °C)      TempSrc: Oral      SpO2: 97%  100% 100%   Weight: 83.8 kg (184 lb 11.2 oz)      Height: 6' 3\" (1.905 m)        -------------------------  BP: (!) 156/74, Temp: 98.5 °F (36.9 °C), Pulse: 51, Resp: 16      RE-EVALUATION:  stable    CONSULTS:  none    PROCEDURES:  None    FINAL IMPRESSION      1. Syncope and collapse    2. Bradycardia          DISPOSITION/PLAN   DISPOSITION Decision To Admit 10/11/2021 08:46:54 AM      CONDITION ON DISPOSITION:   Stable    PATIENT REFERRED TO:  No follow-up provider specified.     DISCHARGE MEDICATIONS:  New Prescriptions    No medications on file       (Please note that portions of this note were completed with a voicerecognition program.  Efforts were made to edit the dictations but occasionally words are mis-transcribed.)    Lia Mora DO  Attending Emergency Medicine Physician                     Lia Mora DO  10/11/21 3283

## 2021-10-11 NOTE — ED NOTES
[facility] RN paging hospitalist at [phone] for admission.       Hussein Mora RN  10/11/21 7696

## 2021-10-11 NOTE — PLAN OF CARE
Problem: Cardiac:  Goal: Ability to maintain vital signs within normal range will improve  Description: Ability to maintain vital signs within normal range will improve  Outcome: Ongoing  Goal: Cardiovascular alteration will improve  Description: Cardiovascular alteration will improve  Outcome: Ongoing     Problem: Physical Regulation:  Goal: Complications related to the disease process, condition or treatment will be avoided or minimized  Description: Complications related to the disease process, condition or treatment will be avoided or minimized  Outcome: Ongoing     Pt. Had syncope episode at home and became diaphoretic and had diarrhea. O2 sats at 98 on room air.  Pulse runs in the low 50's

## 2021-10-11 NOTE — ED PROVIDER NOTES
[address] [facility] ED  EMERGENCY DEPARTMENT ENCOUNTER      Pt Name: Hubert Ly  MRN: [id number]  [place] 1945  Date of evaluation: 10/11/2021  Provider: Krupa Prater MD    [address] [place] [address]       Chief Complaint   Patient presents with    Dizziness       HISTORY OF PRESENT ILLNESS  (Location/Symptom, Timing/Onset, Context/Setting, Quality, Duration, Modifying Factors, Severity.)   Hubert Ly is a 78 y.o. male who presents to the emergency department complaining of dizziness and nausea. He had an apparent syncopal episode at home. He tells me that he was just lying in bed watching TV when it happened. He relates he is not having any pain and did not have any pain at the time. He says he did not fall down or hit his head. He tells me he currently is not feeling so dizzy but he still feels nauseous. Nursing Notes were reviewed. REVIEW OF SYSTEMS    (2-9 systems for level 4, 10 or more for level 5)     Review of Systems   Constitutional: Negative for activity change, appetite change, chills, fatigue and fever. HENT: Negative for congestion, ear pain and sore throat. Eyes: Negative for pain, discharge and redness. Respiratory: Negative for cough, shortness of breath, wheezing and stridor. Cardiovascular: Negative for chest pain. Gastrointestinal: Positive for nausea. Negative for abdominal pain, constipation, diarrhea and vomiting. Genitourinary: Negative for decreased urine volume and difficulty urinating. Musculoskeletal: Negative for arthralgias and myalgias. Skin: Negative for color change and rash. Neurological: Positive for dizziness and syncope. Negative for weakness and headaches. Psychiatric/Behavioral: Negative for behavioral problems and confusion. Except as noted above the remainder of the review of systems was reviewed and negative. PAST MEDICAL HISTORY   History reviewed. No pertinent past medical history.     SURGICAL HISTORY     History reviewed. No pertinent surgical history. CURRENT MEDICATIONS       Previous Medications    No medications on file       ALLERGIES     Patient has no known allergies. FAMILY HISTORY     History reviewed. No pertinent family history. No family status information on file. SOCIAL HISTORY          PHYSICAL EXAM    (up to 7 for level 4, 8 or more for level 5)     ED Triage Vitals   BP Temp Temp Source Pulse Resp SpO2 Height Weight   10/11/21 0448 10/11/21 0446 10/11/21 0446 10/11/21 0446 10/11/21 0446 10/11/21 0446 10/11/21 0446 10/11/21 0446   (!) 163/84 98.5 °F (36.9 °C) Oral 62 16 97 % 6' 3\" (1.905 m) 184 lb 11.2 oz (83.8 kg)     Physical Exam  Vitals and nursing note reviewed. Constitutional:       General: He is not in acute distress. Appearance: He is well-developed. He is not ill-appearing, toxic-appearing or diaphoretic. HENT:      Head: Normocephalic and atraumatic. Right Ear: External ear normal.      Left Ear: External ear normal.      Nose: Nose normal.      Mouth/Throat:      Mouth: Mucous membranes are moist.   Eyes:      General:         Right eye: No discharge. Left eye: No discharge. Conjunctiva/sclera: Conjunctivae normal.      Pupils: Pupils are equal, round, and reactive to light. Cardiovascular:      Rate and Rhythm: Normal rate and regular rhythm. Heart sounds: Normal heart sounds. No murmur heard. Pulmonary:      Effort: Pulmonary effort is normal. No respiratory distress. Breath sounds: Normal breath sounds. No wheezing, rhonchi or rales. Chest:      Chest wall: No tenderness. Abdominal:      General: Bowel sounds are normal. There is no distension. Palpations: Abdomen is soft. There is no mass. Tenderness: There is no abdominal tenderness. There is no guarding or rebound. Musculoskeletal:         General: Normal range of motion. Cervical back: Normal range of motion and neck supple. Right lower leg: No edema. °C)    TempSrc: Oral    SpO2: 97%    Weight: 83.8 kg (184 lb 11.2 oz)    Height: 6' 3\" (1.905 m)      We did discuss imaging, lab work and the patient is comfortable with plan of care. I have answered any questions he has at this time. Will go ahead and do a syncopal work-up and will consider admission as it seems necessary. I did signout the patient for further disposition and care to oncoming physician, Dr. Sabine Madison. CT report is pending and second troponin is also pending. CONSULTS:  None    PROCEDURES:  None    FINAL IMPRESSION           PATIENT REFERRED TO:  No follow-up provider specified.     DISCHARGE MEDICATIONS:  New Prescriptions    No medications on file       (Please note that portions of this note were completed with a voice recognition program.  Efforts were made to edit the dictations but occasionally words are mis-transcribed.)    Abdi Aguilar MD  Attending Emergency Physician        Abdi Aguilar MD  10/11/21 2766

## 2021-10-11 NOTE — ED NOTES
Pt presents to ED via EMS with c/o dizziness. Per EMS pt was at home taking to his wife when he has a syncopal episode. Upon arrival to ED pt alert and oriented x4. Pt states he feels dizzy. Pt diaphoretic. Pt denies pain. Pt denies SOB. Pt afebrile, vitals stable. Pt states he has never felt this way before.       Marco Mustafa RN  10/11/21 2675

## 2021-10-11 NOTE — ED NOTES
Wife out to nurses station reporting about incident. Wife reports concern about possible seizure, reports that pt has never had one before. Wife reports that she thinks the pt might have had a seizure because while he was sitting on the side of the bed taking to his wife, the pt became stiff and fell back into the bed. Wife reports that he was very sweaty during this and that his breathing became long and deep and then she reports that it appeared that he stopped breathing for a moment. Wife reports that he urinated on himself and that the pt suddenly woke up stating that he had to get to the bathroom.       Ameya Benson RN  10/11/21 9740

## 2021-10-12 PROBLEM — E44.1 MILD MALNUTRITION (HCC): Status: ACTIVE | Noted: 2021-10-12

## 2021-10-12 LAB
ANION GAP SERPL CALCULATED.3IONS-SCNC: 9 MMOL/L (ref 9–17)
BUN BLDV-MCNC: 15 MG/DL (ref 8–23)
BUN/CREAT BLD: 12 (ref 9–20)
CALCIUM SERPL-MCNC: 8.9 MG/DL (ref 8.6–10.4)
CHLORIDE BLD-SCNC: 103 MMOL/L (ref 98–107)
CO2: 28 MMOL/L (ref 20–31)
CREAT SERPL-MCNC: 1.26 MG/DL (ref 0.7–1.2)
EKG ATRIAL RATE: 70 BPM
EKG P AXIS: 72 DEGREES
EKG P-R INTERVAL: 178 MS
EKG Q-T INTERVAL: 404 MS
EKG QRS DURATION: 82 MS
EKG QTC CALCULATION (BAZETT): 436 MS
EKG R AXIS: 20 DEGREES
EKG T AXIS: 30 DEGREES
EKG VENTRICULAR RATE: 70 BPM
GFR AFRICAN AMERICAN: >60 ML/MIN
GFR NON-AFRICAN AMERICAN: 56 ML/MIN
GFR SERPL CREATININE-BSD FRML MDRD: ABNORMAL ML/MIN/{1.73_M2}
GFR SERPL CREATININE-BSD FRML MDRD: ABNORMAL ML/MIN/{1.73_M2}
GLUCOSE BLD-MCNC: 104 MG/DL (ref 70–99)
HCT VFR BLD CALC: 45.9 % (ref 40.7–50.3)
HEMOGLOBIN: 14.3 G/DL (ref 13–17)
LV EF: 60 %
LVEF MODALITY: NORMAL
MAGNESIUM: 2 MG/DL (ref 1.6–2.6)
MCH RBC QN AUTO: 25.8 PG (ref 25.2–33.5)
MCHC RBC AUTO-ENTMCNC: 31.2 G/DL (ref 28.4–34.8)
MCV RBC AUTO: 82.7 FL (ref 82.6–102.9)
NRBC AUTOMATED: 0 PER 100 WBC
PDW BLD-RTO: 13.9 % (ref 11.8–14.4)
PLATELET # BLD: 176 K/UL (ref 138–453)
PMV BLD AUTO: 9.4 FL (ref 8.1–13.5)
POTASSIUM SERPL-SCNC: 4 MMOL/L (ref 3.7–5.3)
RBC # BLD: 5.55 M/UL (ref 4.21–5.77)
SODIUM BLD-SCNC: 140 MMOL/L (ref 135–144)
TROPONIN INTERP: NORMAL
TROPONIN T: NORMAL NG/ML
TROPONIN, HIGH SENSITIVITY: 21 NG/L (ref 0–22)
TSH SERPL DL<=0.05 MIU/L-ACNC: 2.35 MIU/L (ref 0.3–5)
WBC # BLD: 6.1 K/UL (ref 3.5–11.3)

## 2021-10-12 PROCEDURE — 2060000000 HC ICU INTERMEDIATE R&B

## 2021-10-12 PROCEDURE — 6370000000 HC RX 637 (ALT 250 FOR IP): Performed by: STUDENT IN AN ORGANIZED HEALTH CARE EDUCATION/TRAINING PROGRAM

## 2021-10-12 PROCEDURE — 84443 ASSAY THYROID STIM HORMONE: CPT

## 2021-10-12 PROCEDURE — 97166 OT EVAL MOD COMPLEX 45 MIN: CPT

## 2021-10-12 PROCEDURE — 97116 GAIT TRAINING THERAPY: CPT

## 2021-10-12 PROCEDURE — 80048 BASIC METABOLIC PNL TOTAL CA: CPT

## 2021-10-12 PROCEDURE — 97535 SELF CARE MNGMENT TRAINING: CPT

## 2021-10-12 PROCEDURE — 83735 ASSAY OF MAGNESIUM: CPT

## 2021-10-12 PROCEDURE — 93005 ELECTROCARDIOGRAM TRACING: CPT | Performed by: STUDENT IN AN ORGANIZED HEALTH CARE EDUCATION/TRAINING PROGRAM

## 2021-10-12 PROCEDURE — 93306 TTE W/DOPPLER COMPLETE: CPT

## 2021-10-12 PROCEDURE — 84484 ASSAY OF TROPONIN QUANT: CPT

## 2021-10-12 PROCEDURE — APPSS45 APP SPLIT SHARED TIME 31-45 MINUTES: Performed by: NURSE PRACTITIONER

## 2021-10-12 PROCEDURE — APPNB60 APP NON BILLABLE TIME 46-60 MINS: Performed by: NURSE PRACTITIONER

## 2021-10-12 PROCEDURE — 85027 COMPLETE CBC AUTOMATED: CPT

## 2021-10-12 PROCEDURE — 2580000003 HC RX 258: Performed by: NURSE PRACTITIONER

## 2021-10-12 PROCEDURE — 6370000000 HC RX 637 (ALT 250 FOR IP): Performed by: NURSE PRACTITIONER

## 2021-10-12 PROCEDURE — 6360000002 HC RX W HCPCS: Performed by: NURSE PRACTITIONER

## 2021-10-12 PROCEDURE — 99232 SBSQ HOSP IP/OBS MODERATE 35: CPT | Performed by: INTERNAL MEDICINE

## 2021-10-12 PROCEDURE — 36415 COLL VENOUS BLD VENIPUNCTURE: CPT

## 2021-10-12 PROCEDURE — 97162 PT EVAL MOD COMPLEX 30 MIN: CPT

## 2021-10-12 RX ORDER — NIFEDIPINE 30 MG/1
30 TABLET, EXTENDED RELEASE ORAL DAILY
Status: DISCONTINUED | OUTPATIENT
Start: 2021-10-12 | End: 2021-10-13 | Stop reason: HOSPADM

## 2021-10-12 RX ORDER — OXYCODONE HYDROCHLORIDE 5 MG/1
5 TABLET ORAL EVERY 4 HOURS PRN
Status: DISCONTINUED | OUTPATIENT
Start: 2021-10-12 | End: 2021-10-13 | Stop reason: HOSPADM

## 2021-10-12 RX ORDER — ASPIRIN 81 MG/1
81 TABLET, CHEWABLE ORAL DAILY
Status: DISCONTINUED | OUTPATIENT
Start: 2021-10-12 | End: 2021-10-13 | Stop reason: HOSPADM

## 2021-10-12 RX ORDER — OXYCODONE HYDROCHLORIDE 5 MG/1
10 TABLET ORAL EVERY 4 HOURS PRN
Status: DISCONTINUED | OUTPATIENT
Start: 2021-10-12 | End: 2021-10-13 | Stop reason: HOSPADM

## 2021-10-12 RX ORDER — HYDRALAZINE HYDROCHLORIDE 25 MG/1
25 TABLET, FILM COATED ORAL ONCE
Status: COMPLETED | OUTPATIENT
Start: 2021-10-12 | End: 2021-10-12

## 2021-10-12 RX ORDER — CLINDAMYCIN HYDROCHLORIDE 150 MG/1
300 CAPSULE ORAL EVERY 6 HOURS SCHEDULED
Status: DISCONTINUED | OUTPATIENT
Start: 2021-10-12 | End: 2021-10-13 | Stop reason: HOSPADM

## 2021-10-12 RX ADMIN — ACETAMINOPHEN 650 MG: 325 TABLET ORAL at 04:10

## 2021-10-12 RX ADMIN — ACETAMINOPHEN 650 MG: 325 TABLET ORAL at 11:15

## 2021-10-12 RX ADMIN — OXYCODONE 5 MG: 5 TABLET ORAL at 16:06

## 2021-10-12 RX ADMIN — LISINOPRIL 40 MG: 40 TABLET ORAL at 08:07

## 2021-10-12 RX ADMIN — OXYCODONE 10 MG: 5 TABLET ORAL at 20:02

## 2021-10-12 RX ADMIN — CLINDAMYCIN HYDROCHLORIDE 300 MG: 150 CAPSULE ORAL at 23:25

## 2021-10-12 RX ADMIN — SODIUM CHLORIDE, PRESERVATIVE FREE 10 ML: 5 INJECTION INTRAVENOUS at 20:21

## 2021-10-12 RX ADMIN — SODIUM CHLORIDE, PRESERVATIVE FREE 10 ML: 5 INJECTION INTRAVENOUS at 08:07

## 2021-10-12 RX ADMIN — CLINDAMYCIN HYDROCHLORIDE 300 MG: 150 CAPSULE ORAL at 17:38

## 2021-10-12 RX ADMIN — NIFEDIPINE 30 MG: 30 TABLET, FILM COATED, EXTENDED RELEASE ORAL at 20:18

## 2021-10-12 RX ADMIN — HYDRALAZINE HYDROCHLORIDE 25 MG: 25 TABLET, FILM COATED ORAL at 23:25

## 2021-10-12 RX ADMIN — CLINDAMYCIN HYDROCHLORIDE 300 MG: 150 CAPSULE ORAL at 11:40

## 2021-10-12 RX ADMIN — ASPIRIN 81 MG: 81 TABLET, CHEWABLE ORAL at 11:40

## 2021-10-12 RX ADMIN — ENOXAPARIN SODIUM 40 MG: 40 INJECTION SUBCUTANEOUS at 08:08

## 2021-10-12 RX ADMIN — ACETAMINOPHEN 650 MG: 325 TABLET ORAL at 17:38

## 2021-10-12 RX ADMIN — ATORVASTATIN CALCIUM 20 MG: 20 TABLET, FILM COATED ORAL at 08:07

## 2021-10-12 ASSESSMENT — PAIN DESCRIPTION - LOCATION
LOCATION: LEG
LOCATION: JAW

## 2021-10-12 ASSESSMENT — PAIN SCALES - GENERAL
PAINLEVEL_OUTOF10: 2
PAINLEVEL_OUTOF10: 8
PAINLEVEL_OUTOF10: 8
PAINLEVEL_OUTOF10: 7
PAINLEVEL_OUTOF10: 8
PAINLEVEL_OUTOF10: 6
PAINLEVEL_OUTOF10: 0
PAINLEVEL_OUTOF10: 8
PAINLEVEL_OUTOF10: 6
PAINLEVEL_OUTOF10: 7
PAINLEVEL_OUTOF10: 5
PAINLEVEL_OUTOF10: 6

## 2021-10-12 ASSESSMENT — PAIN DESCRIPTION - PROGRESSION
CLINICAL_PROGRESSION: GRADUALLY IMPROVING
CLINICAL_PROGRESSION: GRADUALLY WORSENING
CLINICAL_PROGRESSION: GRADUALLY IMPROVING

## 2021-10-12 ASSESSMENT — PAIN DESCRIPTION - ORIENTATION
ORIENTATION: RIGHT

## 2021-10-12 ASSESSMENT — PAIN DESCRIPTION - DIRECTION: RADIATING_TOWARDS: NECK

## 2021-10-12 ASSESSMENT — PAIN DESCRIPTION - PAIN TYPE
TYPE: ACUTE PAIN
TYPE: ACUTE PAIN
TYPE: CHRONIC PAIN
TYPE: ACUTE PAIN

## 2021-10-12 ASSESSMENT — PAIN DESCRIPTION - DESCRIPTORS: DESCRIPTORS: SHARP;TENDER

## 2021-10-12 NOTE — PLAN OF CARE
Problem: Cardiac:  Goal: Ability to maintain vital signs within normal range will improve  Description: Ability to maintain vital signs within normal range will improve  Outcome: Ongoing     Problem: Cardiac:  Goal: Cardiovascular alteration will improve  Description: Cardiovascular alteration will improve  Outcome: Ongoing     Problem: Falls - Risk of:  Goal: Will remain free from falls  Description: Will remain free from falls  Outcome: Ongoing     Problem: Falls - Risk of:  Goal: Absence of physical injury  Description: Absence of physical injury  Outcome: Ongoing

## 2021-10-12 NOTE — PROGRESS NOTES
[facility]  Office: [address], DO, Cocurtmick Fausto, DO, Kala Gonzalez, DO, Yonas Dexter Blood, DO, Radha Gallagher MD, Marissa Rodriguez MD, Melody Bragg MD, Jp Sun MD, Robbie Jaimes MD, Epifanio Olea MD, Alisa Espinal MD, Nazanin Spears, DO, Stan Jenkins, DO, Vivian Jansen MD,  Napoleon Melo DO, Jessica Winchester MD, Evie Gamez MD, Radha Galaviz MD, Catherine Pantoja MD, Denise Hernández MD, Emerald Rodriguez MD, Andriy Cabrera, [facility], [facility], CNP, Jesika Contreras, CNP, Latonya Rebolledo, CNS, Torrie Schilling, [facility], Tarik Bradley, CNP, [place], [facility], Meagan Lino, [facility], Jess Carrasco, CNP, Rowan Oppenheim, PA-C, Walker Lima, [facility], Estefanía Naranjo, CNP, José Antonio Swan, CNP, Avelina Guillen, CNP, Christie Garcia, CNP, Merced Vásquez, CNP, Jean Paul Vicente, CNP, Marylou Thompson, [facility]    Progress Note    10/12/2021    9:17 AM    Name:   Paula Rosenberg  MRN:     [id number]     Acct:      [de-identified]   Room:   [address] [place] [address] Day:  1  Admit Date:  10/11/2021  4:46 AM    PCP:   No primary care provider on file. Code Status:  Full Code    Subjective:     C/C:   Chief Complaint   Patient presents with    Dizziness     Interval History Status: improved. Condition is improved overnight. Patient had very few episodes of bradycardia and no recurrent syncope. Cardiology on board and they recommend continued observation for the next 24 hours. Echo results are still pending. Of additional note patient has a swollen right mandible which will need to be evaluated further as an outpatient. Patient will be started on clindamycin and we await echo results to ensure no vegetative growth secondary to dental infection. Brief History:     10/11 - Patient reported that over the past 48 hours she has been having a \"empty\" feeling in his chest multiple times has been sitting or lying down and will suddenly become very dizzy and passed out. Patient reports she has never had any symptoms like this before and they have not happened unprovoked. Telemetry monitoring did not capture multiple episodes of profound bradycardia with ventricular escape beats and bigeminy. 10/12 -condition improved overnight. No syncope. Cardiology on board. Review of Systems:     Constitutional:  negative for chills, fevers, sweats  Respiratory:  negative for cough, dyspnea on exertion, shortness of breath, wheezing  Cardiovascular:  negative for chest pain, chest pressure/discomfort, lower extremity edema, palpitations  Gastrointestinal:  negative for abdominal pain, constipation, diarrhea, nausea, vomiting  Neurological:  negative for dizziness, headache    Medications: Allergies:  No Known Allergies    Current Meds:   Scheduled Meds:    sodium chloride flush  5-40 mL IntraVENous 2 times per day    enoxaparin  40 mg SubCUTAneous Daily    [Held by provider] carvedilol  25 mg Oral BID WC    lisinopril  40 mg Oral Daily    atorvastatin  20 mg Oral Daily    nicotine  1 patch TransDERmal Daily     Continuous Infusions:    sodium chloride       PRN Meds: sodium chloride flush, sodium chloride, potassium chloride **OR** potassium alternative oral replacement **OR** potassium chloride, magnesium sulfate, ondansetron **OR** ondansetron, polyethylene glycol, acetaminophen **OR** acetaminophen, perflutren lipid microspheres    Data:     Past Medical History:   has a past medical history of CAD (coronary artery disease). Social History:   reports that he has been smoking cigarettes. He has been smoking about 1.00 pack per day. He uses smokeless tobacco. He reports previous alcohol use. He reports that he does not use drugs. Family History: History reviewed. No pertinent family history.     Vitals:  BP (!) 159/88   Pulse 57   Temp 98.1 °F (36.7 °C) (Oral)   Resp 16   Ht 6' 3\" (1.905 m)   Wt 184 lb 11.2 oz (83.8 kg)   SpO2 98%   BMI 23.09 kg/m²   Temp (24hrs), Av.2 °F (36.8 °C), Min:98.1 °F (36.7 °C), Max:98.4 °F (36.9 °C)    No results for input(s): POCGLU in the last 72 hours. I/O (24Hr): Intake/Output Summary (Last 24 hours) at 10/12/2021 09  Last data filed at 10/12/2021 8012  Gross per 24 hour   Intake 440 ml   Output 1050 ml   Net -610 ml       Labs:  Hematology:  Recent Labs     10/11/21  0450 10/12/21  0530   WBC 5.7 6.1   RBC 5.74 5.55   HGB 15.2 14.3   HCT 47.4 45.9   MCV 82.7 82.7   MCH 26.5 25.8   MCHC 32.1 31.2   RDW 14.2 13.9    176   MPV 7.6 9.4   INR 1.0  --      Chemistry:  Recent Labs     10/11/21  0450 10/11/21  0725 10/12/21  0530     --  140   K 3.7  --  4.0     --  103   CO2 26  --  28   GLUCOSE 139*  --  104*   BUN 14  --  15   CREATININE 1.20  --  1.26*   MG  --   --  2.0   ANIONGAP 11  --  9   LABGLOM 59*  --  56*   GFRAA >60  --  >60   CALCIUM 9.2  --  8.9   PROBNP 112  --   --    TROPHS 11 13  --      Recent Labs     10/11/21  0450   PROT 7.1   LABALBU 4.0   AST 15   ALT 10   ALKPHOS 62   BILITOT 0.40   AMYLASE 88   LIPASE 25     ABG:No results found for: POCPH, PHART, PH, POCPCO2, GBR8MFC, PCO2, POCPO2, PO2ART, PO2, POCHCO3, BAL0AEA, HCO3, NBEA, PBEA, BEART, BE, THGBART, THB, HQF0ENL, SRHN0NCX, E7TMURIN, O2SAT, FIO2  No results found for: SPECIAL  No results found for: CULTURE    Radiology:  CT Head WO Contrast    Result Date: 10/11/2021  No acute intracranial abnormality. XR CHEST PORTABLE    Result Date: 10/11/2021  Normal examination. Physical Examination:        General appearance:  alert, cooperative and no distress  Mental Status:  oriented to person, place and time and normal affect  Lungs:  clear to auscultation bilaterally, normal effort  Heart:  Rate and Rhythm: Bradycardia resolved. Rhythm irregular.    Abdomen:  soft, nontender, nondistended, normal bowel sounds, no masses, hepatomegaly, splenomegaly  Extremities:  no edema, redness, tenderness in the calves  Skin:  no gross lesions, rashes, induration    Assessment:        Hospital Problems         Last Modified POA    * (Principal) Syncope, cardiogenic 10/11/2021 Yes    CAD (coronary artery disease) 10/11/2021 Yes    Overview Signed 10/11/2021  2:44 PM by SHOAIB Mir NP     MI with stent approx 12 years         Hyperlipemia 10/11/2021 Yes    Hypertension 10/11/2021 Yes          Plan:        1. Cardiogenic syncope secondary to bradycardia with a personal history of coronary disease, hyperlipidemia, hypertension  1. Rate well controlled at this time, no indication for acute intervention  2. Cardiac echo still pending  3. Cardiology consultation for evaluation and diagnostic recommendations  4. Hold BB  2. Mandibular swelling and pain  1. Clindamycin  2.  Recommend outpatient follow-up with dental services        SHOAIB Mir NP  10/12/2021  9:17 AM

## 2021-10-12 NOTE — PROGRESS NOTES
Occupational Therapy   Occupational Therapy Initial Assessment  Date: 10/12/2021   Patient Name: Kalyan Hui  MRN: [id number]     : 1945    RN [facility] reports patient is medically stable for therapy treatment this date. Chart reviewed prior to treatment and patient is agreeable for therapy. All lines intact and patient positioned comfortably at end of treatment. All patient needs addressed prior to ending therapy session. Date of Service: 10/12/2021    Discharge Recommendations:  Patient would benefit from continued therapy after discharge at outpatient setting  OT Equipment Recommendations  Equipment Needed: Yes  Mobility Devices: ADL Assistive Devices  ADL Assistive Devices: Toileting - 3-in-1 Commode;Grab Bars - shower; Reacher;Long-handled Sh[place];Long-handled Sponge;Emergency Alert System    Assessment   Performance deficits / Impairments: Decreased functional mobility ; Decreased safe awareness;Decreased balance;Decreased endurance;Decreased high-level IADLs  Assessment: Skilled OT is recommended to increase overall balance and act davida to reduce fall risk and improve functional I and safety as able to return home with family assist as needed. Prognosis: Good  Decision Making: Medium Complexity  OT Education: Family Education;OT Role;Transfer Training;Plan of Care;Energy Conservation  Patient Education: pursed lip breathing, safety in function, call light use/fall prevention, recommendations for continued therapy  REQUIRES OT FOLLOW UP: Yes  Activity Tolerance  Activity Tolerance: Patient limited by fatigue;Patient limited by pain  Activity Tolerance: fair minus  Safety Devices  Safety Devices in place: Yes  Type of devices: Call light within reach; Left in chair;Chair alarm in place; Patient at risk for falls;Gait belt;Nurse notified (spouse in with pt upon exit)           Patient Diagnosis(es): The primary encounter diagnosis was Syncope and collapse.  A diagnosis of Bradycardia was also pertinent to this visit. has a past medical history of CAD (coronary artery disease). has no past surgical history on file. PER H&P: Enma Steve is a 78 y.o. Non- / non  male who presents with Dizziness   and is admitted to the hospital for the management of Syncope, cardiogenic.        Restrictions  Restrictions/Precautions  Restrictions/Precautions: General Precautions, Fall Risk  Position Activity Restriction  Other position/activity restrictions: telemetry, up with assist, RUE IV, LUPE diet, alarms    Subjective   General  Chart Reviewed: Yes  Patient assessed for rehabilitation services?: Yes  Family / Caregiver Present: Yes (spouse)  Patient Currently in Pain: Yes  Pain Assessment  Clinical Progression: Gradually improving  Pre Treatment Pain Screening  Comments / Details: Pt states he has R sided jaw pain and rates 7-8/10 and pt stated he had tylenol prior to writer arrival.    Social/Functional History  Social/Functional History  Lives With: Spouse  Type of Home: House (townhouse)  Home Layout: Two level (laundry on main)  Home Access: Stairs to enter without rails  Entrance Stairs - Number of Steps: one from garage  Bathroom Shower/Tub: Tub/Shower unit  [employer]: Standard (has vanity support near toilet he can hold if needed)  Bathroom Equipment:  (no equipment)  Home Equipment: [employer] Help From: Family (spouse is supportive & retired)  ADL Assistance: Independent  Homemaking Assistance: Needs assistance (Pt states he does laundry, spouse does all other IADL's)  Ambulation Assistance: Independent  Transfer Assistance: Independent  Active : Yes  Mode of Transportation: Car  Occupation: Retired  Type of occupation: [address] [place] [address] Avenue: sports on TV  Additional Comments: Pt denies falls. Spouse states pt was sitting on the EOB and fell backwards in bed, was sweating, tensed up, and with loss of bladder control.        Objective   Vision: Impaired  Vision Exceptions: Wears glasses at all times (Pt denies vision changes; spouse states previous B cataract sx)  Hearing: Exceptions to [facility]  Hearing Exceptions: Hard of hearing/hearing concerns    Orientation  Overall Orientation Status: Within Functional Limits  Observation/Palpation  Posture: Good  Observation: RUE IV, pt with flat affect, slight dysarthria noted. Pt with no c/o dizziness this session. Edema: R jaw  Balance  Sitting Balance: Stand by assistance  Standing Balance: Minimal assistance (min to CG)  Standing Balance  Time: stand davida > 5 mins with functional tasks  Functional Mobility  Functional - Mobility Device: No device  Activity:  (bed to toilet, toilet to sink, sink to door and out to sequeira, sequeira back to bedside chair)  Assist Level: Minimal assistance (min to CG)  Functional Mobility Comments: MIN cues needed for pacing and slowing down movements and scanning environment to increase safety/reduce falls. Toilet Transfers  Toilet - Technique: Ambulating  Equipment Used: Grab bars (stood at toilet to urinate)  Toilet Transfer: Contact guard assistance  Toilet Transfers Comments: MIN cues needed for hand placement on grab bar to increase safety.      ADL  Feeding: Independent  Grooming: Setup;Stand by assistance (for washing B hands standing at sink)  UE Bathing: Setup;Stand by assistance  LE Bathing: Setup;Contact guard assistance  UE Dressing: Setup;Minimal assistance (with hosp gown)  LE Dressing: Setup;Minimal assistance (pt was able to raymon B socks seated EOB and crossing BLE's with set up/SBA)  Toileting: Minimal assistance (with gown mgt; pt stood at toilet to urinate and CG for balance)  Tone RUE  RUE Tone: Normotonic  Tone LUE  LUE Tone: Normotonic  Coordination  Movements Are Fluid And Coordinated: No     Bed mobility  Supine to Sit: Contact guard assistance  Sit to Supine: Unable to assess (pt agreed to sit up in chair after edu on the benefits of being up OOB as able)  Comment: MIN cues for slowing down movements and scooting fully to EOB to place BLE's on floor to increase safety. Pt with good use of rails. Transfers  Stand Step Transfers: Minimal assistance;Contact guard assistance (min to CG no AD)  Slide Board: Contact guard assistance  Sit to stand: Contact guard assistance  Transfer Comments: MOD-MIN cues for hand placement reaching back to surface, pacing self and slowing down movements, scanning to increase overall safety. Cognition  Overall Cognitive Status: Exceptions  Arousal/Alertness: Appropriate responses to stimuli  Following Commands:  Follows all commands without difficulty  Attention Span: Appears intact  Memory: Decreased short term memory  Safety Judgement: Decreased awareness of need for safety;Decreased awareness of need for assistance  Problem Solving: Assistance required to identify errors made;Assistance required to correct errors made;Decreased awareness of errors  Insights: Decreased awareness of deficits  Initiation: Requires cues for some  Sequencing: Requires cues for some  Perception  Overall Perceptual Status: WFL     Sensation  Overall Sensation Status: WFL        LUE AROM (degrees)  LUE AROM : WFL  RUE AROM (degrees)  RUE AROM : WFL  LUE Strength  Gross LUE Strength: WFL  LUE Strength Comment: BUE strength grossly 4 plus/5  RUE Strength  Gross RUE Strength: WFL                   Plan   Plan  Times per week: 4-5x/week 1x/day as davida  Current Treatment Recommendations: Strengthening, Endurance Training, Neuromuscular Re-education, Patient/Caregiver Education & Training, Equipment Evaluation, Education, & procurement, Self-Care / ADL, Pain Management, Safety Education & Training, Functional Mobility Training, Home Management Training, Cognitive/Perceptual Training                                                    AM-PAC Score   19          Goals  Short term goals  Time Frame for Short term goals: by discharge, pt to demo  Short term goal 1: bed mob tasks with use of rail as needed to MOD I-I. Short term goal 2: ADL transfers and functional mob with AD as needed to MOD I-I. Short term goal 3: I with BUE HEP with use of handouts as needed to maintain strength. Short term goal 4: total body ADL tasks with use of grab bar/AD and AE as needed to MOD I-I. Short term goal 5: standing to SUP with AD as needed davida > 7 mins as needed to reduce falls in function. Long term goals  Long term goal 1: Pt/caregivers to be I with EC/WS and fall prevention and DME/AE and AD recommendations with use of handouts. Patient Goals   Patient goals : Pt states he does not want to have pain and get back home!        Therapy Time   Individual Concurrent Group Co-treatment   Time In 1012 (plus 10 min chart review/nursing communication)         Time Out 1053         Minutes 41         Timed Code Treatment Minutes: Luz Pan

## 2021-10-12 NOTE — CONSULTS
Reason for Consult:  Syncope   Requesting Physician: Jaspal Cameron DO    CHIEF COMPLAINT:  Syncope    History Obtained From:  Patient, wife and chart    HISTORY OF PRESENT ILLNESS:      The patient is a 78 y.o. male with significant past medical history of HTN, Dementia, HLD and reported CAD (s/p stenting 12 years ago) who presents with syncope. He reports being in his usual state of health. He denies recent CP, SOB, dizziness, edema. He is active around his house and does not complain of symptoms. His wife has no complaints. She states she was talking to him while he was sitting on the bed. He was doing fine, then suddenly felt dizzy and passed out. Denies seizure activity. He started agonal breathing, was very sweaty and she called EMS. Reportedly he was bradycardic with ventricular escape/PVCs/Bigeminy but these strips are not available at this time. By the time he got to the urgent care, he was doing better. Still felt a little dizzy. HR in the 50s with frequent ventricular ectopy. He was admitted for further monitoring. Since admission, he has been feeling back to normal. Tele still showing Sinus Bradycardia in 50s with frequent PVCs and occasional Bigeminy. No pauses. He had been on Coreg 25mg BID as OP and this was held. Troponins were checked and were normal/stable. His only complaint in right jaw pain where he has an abscess. Otherwise no new symptoms or issues overnight or today. Past Medical History:    Past Medical History:   Diagnosis Date    CAD (coronary artery disease)     MI with stent approx 12 years     Past Surgical History:    History reviewed. No pertinent surgical history. Home Medications:  Prior to Admission medications    Medication Sig Start Date End Date Taking?  Authorizing Provider   carvedilol (COREG) 25 MG tablet Take 25 mg by mouth 2 times daily (with meals)   Yes Historical Provider, MD   lisinopril (PRINIVIL;ZESTRIL) 40 MG tablet Take 40 mg by mouth daily Yes Historical Provider, MD   simvastatin (ZOCOR) 40 MG tablet Take 40 mg by mouth nightly   Yes Historical Provider, MD   sildenafil (VIAGRA) 100 MG tablet Take 100 mg by mouth as needed for Erectile Dysfunction   Yes Historical Provider, MD   vitamin B-12 (CYANOCOBALAMIN) 1000 MCG tablet Take 1,000 mcg by mouth daily   Yes Historical Provider, MD   vitamin D (CHOLECALCIFEROL) 25 MCG (1000 UT) TABS tablet Take 1,000 Units by mouth daily   Yes Historical Provider, MD     Current Medications:    Current Facility-Administered Medications   Medication Dose Route Frequency Provider Last Rate Last Admin    sodium chloride flush 0.9 % injection 5-40 mL  5-40 mL IntraVENous 2 times per day Carla Duran APRN - NP   10 mL at 10/12/21 0807    sodium chloride flush 0.9 % injection 10 mL  10 mL IntraVENous PRN Carla Miguelte, APRN - NP        0.9 % sodium chloride infusion  25 mL IntraVENous PRN Carla Miguelte, APRN - NP        potassium chloride (KLOR-CON M) extended release tablet 40 mEq  40 mEq Oral PRN Carla Miguelte, APRN - NP        Or    potassium bicarb-citric acid (EFFER-K) effervescent tablet 40 mEq  40 mEq Oral PRN Carla Miguelte, APRN - NP        Or   [facility] potassium chloride 10 mEq/100 mL IVPB (Peripheral Line)  10 mEq IntraVENous PRN Carla Miguelte, APRN - NP        magnesium sulfate 1000 mg in dextrose 5% 100 mL IVPB  1,000 mg IntraVENous PRN Carla Miguelte, APRN - NP        enoxaparin (LOVENOX) injection 40 mg  40 mg SubCUTAneous Daily Carla Duran APRN - NP   40 mg at 10/12/21 1508    ondansetron (ZOFRAN-ODT) disintegrating tablet 4 mg  4 mg Oral Q8H PRN Carla Miguelte, APRN - NP        Or    ondansetron [facility]) injection 4 mg  4 mg IntraVENous Q6H PRN Carla Miguelte, APRN - NP        polyethylene glycol (GLYCOLAX) packet 17 g  17 g Oral Daily PRN Carla Miguelte, APRN - NP        acetaminophen (TYLENOL) tablet 650 mg  650 mg Oral Q6H PRN Carla Miguelte, APRN - NP   650 mg at 10/12/21 0410    Or  acetaminophen (TYLENOL) suppository 650 mg  650 mg Rectal Q6H PRN Rosalene Bone, APRN - NP        perflutren lipid microspheres (DEFINITY) injection 1.65 mg  1.5 mL IntraVENous ONCE PRN Rosalene Bone, APRN - NP        [Held by provider] carvedilol (COREG) tablet 25 mg  25 mg Oral BID WC Rosalene Bone, APRN - NP        lisinopril (PRINIVIL;ZESTRIL) tablet 40 mg  40 mg Oral Daily Rosalene Bone, APRN - NP   40 mg at 10/12/21 0424    atorvastatin (LIPITOR) tablet 20 mg  20 mg Oral Daily Rosalene Bone, APRN - NP   20 mg at 10/12/21 0807    nicotine (NICODERM CQ) 21 MG/24HR 1 patch  1 patch TransDERmal Daily Rosalene Bone, APRN - NP   1 patch at 10/12/21 7174     Allergies:  Patient has no known allergies. Social History:    Social History     Socioeconomic History    Marital status:      Spouse name: Not on file    Number of children: Not on file    Years of education: Not on file    Highest education level: Not on file   Occupational History    Not on file   Tobacco Use    Smoking status: Current Every Day Smoker     Packs/day: 1.00     Types: Cigarettes    Smokeless tobacco: Current User   Vaping Use    Vaping Use: Never assessed   Substance and Sexual Activity    Alcohol use: Not Currently     Comment: sober for 11/2 years, began drinking ater [place]    Drug use: Never    Sexual activity: Not on file   Other Topics Concern    Not on file   Social History Narrative    Not on file     Social Determinants of Health     Financial Resource Strain:     Difficulty of Paying Living Expenses:    Food Insecurity:     Worried About [address] in the Last Year:     [address] in the Last Year:    Transportation Needs:     Lack of Transportation (Medical):      Lack of Transportation (Non-Medical):    Physical Activity:     Days of Exercise per Week:     Minutes of Exercise per Session:    Stress:     Feeling of Stress :    Social Connections:     Frequency of Communication with Friends and Family:     Frequency of Social Gatherings with Friends and Family:     Attends Zoroastrianism Services:     Active Member of Clubs or Organizations:     Attends Club or Organization Meetings:     Marital Status:    Intimate Partner Violence:     Fear of Current or Ex-Partner:     Emotionally Abused:     Physically Abused:     Sexually Abused:      Family History:   History reviewed. No pertinent family history. · REVIEW OF SYSTEMS   CONSTITUTIONAL: negative  EYES:  negative  HEENT:  negative  RESPIRATORY: negative   CARDIOVASCULAR:  negative  GASTROINTESTINAL:  negative  GENITOURINARY:  negative  INTEGUMENT/BREAST:  negative  HEMATOLOGIC/LYMPHATIC:  negativeive\"}  MUSCULOSKELETAL:  negative  NEUROLOGICAL:  positive for syncope  BEHAVIOR/PSYCH:  negative    PHYSICAL EXAM:    Vitals:    VITALS:  BP (!) 159/88   Pulse 57   Temp 98.1 °F (36.7 °C) (Oral)   Resp 16   Ht 6' 3\" (1.905 m)   Wt 184 lb 11.2 oz (83.8 kg)   SpO2 98%   BMI 23.09 kg/m²   24HR INTAKE/OUTPUT:      Intake/Output Summary (Last 24 hours) at 10/12/2021 0911  Last data filed at 10/12/2021 2414  Gross per 24 hour   Intake 440 ml   Output 1050 ml   Net -610 ml       CONSTITUTIONAL:  awake, alert, cooperative, no apparent distress, and appears stated age  EYES: Pupils equal, round and reactive to light, extra ocular muscles intact, sclera clear, conjunctiva normal  ENT:  normocepalic, without obvious abnormality  NECK:  supple, symmetrical, trachea midline, no carotid bruit ,   No  JVD  BACK:  symmetric  LUNGS: Non-labored, good air exchange, clear to auscultation bilaterally, no crackles or wheezing  CARDIOVASCULAR:  Normal apical impulse, regular rate and rhythm, normal S1 and S2, no S3 or S4, and no murmur noted, no rub.  bilateralpresent 2+  ABDOMEN:  No scars, normal bowel sounds, soft, non-distended, non-tender, no masses palpated, no hepatosplenomegally, no bruit.   MUSCULOSKELETAL:  there is no redness, warmth, or swelling of the joints   No leg edema. NEUROLOGIC:  Awake, alert, oriented to name, place and time. SKIN:  no bruising or bleeding, normal skin color, texture, turgor and no jaundice    DATA:   ECG:  NSR with frequent PVCs.  Early repol in V2-V3      ECHO: Pending    Stress Test:  None available  Angiography:  None available    Cardiology Labs:  Recent Labs     10/11/21  0450 10/11/21  0725   TROPHS 11 13   TROPONINT NOT REPORTED NOT REPORTED     Warfarin PT/INR:  Lab Results   Component Value Date    PROTIME 10.4 10/11/2021    INR 1.0 10/11/2021     CBC:  Lab Results   Component Value Date    WBC 6.1 10/12/2021    RBC 5.55 10/12/2021    HGB 14.3 10/12/2021    HCT 45.9 10/12/2021    MCV 82.7 10/12/2021    MCH 25.8 10/12/2021    MCHC 31.2 10/12/2021    RDW 13.9 10/12/2021     10/12/2021    MPV 9.4 10/12/2021     CMP:  Lab Results   Component Value Date     10/12/2021    K 4.0 10/12/2021     10/12/2021    CO2 28 10/12/2021    BUN 15 10/12/2021    CREATININE 1.26 10/12/2021    GFRAA >60 10/12/2021    LABGLOM 56 10/12/2021    GLUCOSE 104 10/12/2021    CALCIUM 8.9 10/12/2021     Magnesium:    Lab Results   Component Value Date    MG 2.0 10/12/2021     PTT:    Lab Results   Component Value Date    APTT 21.3 10/11/2021     TSH:  No results found for: TSH  BNP:   Recent Labs     10/11/21  0450   PROBNP 112     BMP:  Lab Results   Component Value Date     10/12/2021    K 4.0 10/12/2021     10/12/2021    CO2 28 10/12/2021    BUN 15 10/12/2021    LABALBU 4.0 10/11/2021    CREATININE 1.26 10/12/2021    CALCIUM 8.9 10/12/2021    GFRAA >60 10/12/2021    LABGLOM 56 10/12/2021    GLUCOSE 104 10/12/2021     LIVER PROFILE:  Recent Labs     10/11/21  0450   AST 15   ALT 10   LABALBU 4.0   ALKPHOS 62   BILITOT 0.40   PROT 7.1   ALBUMIN 1.3     FLP:  No results found for: CHOL, TRIG, HDL, LDLCHOLESTEROL    IMPRESSION    · Syncope  · Bradycardia  · Frequent PVCs  · Coronary Artery Disease  Sp stent 12 years ago  · HTN  · HLD  · Dementia - managed by others. Not formally diagnosed      RECOMMENDATIONS:     1. Do not suspect ACS due to negative troponin. Will recheck one last value today to confirm  2. Unclear presenting rhythm but reports of severe bradycardia with escape beats may be cause of syncope  3. Hold BB today and keep on monitor  4. Check TSH  5. Keep NPO tonight. My partner Dr. Gerri Avilez (EP) can evaluate progress and determine need for EP study/pacemaker. No plans for cath at this time  6. Obtain 2D ECHO    Discussed with patient, nurse and wife      Electronically signed by Silas Fisher MD on 10/12/2021 at 9:11 AM     CC: No primary care provider on file.

## 2021-10-12 NOTE — PLAN OF CARE
Problem: Cardiac:  Goal: Ability to maintain vital signs within normal range will improve  Description: Ability to maintain vital signs within normal range will improve  10/12/2021 0823 by Nain Lang RN  Outcome: Ongoing  10/12/2021 0231 by Thien Thurman RN  Outcome: Ongoing     Problem: Cardiac:  Goal: Cardiovascular alteration will improve  Description: Cardiovascular alteration will improve  10/12/2021 0823 by Nain Lang RN  Outcome: Ongoing  10/12/2021 0231 by Thien Thurman RN  Outcome: Ongoing    Patient had some episodes bigeminy trigemeniny.  Currently asymptomatic

## 2021-10-12 NOTE — PROGRESS NOTES
Comprehensive Nutrition Assessment    Type and Reason for Visit:  Positive Nutrition Screen (Unplanned weight loss, decreased appetite. Dietitian consult needed: weight loss)    Nutrition Recommendations/Plan:   1. Continue ADULT DIET; Regular; No Added Salt (3-4 gm)  2. Monitor p.o intakes and labs    Nutrition Assessment:  Patient admission is related to syncope and collapse. Patient and family remember reports weight loss over the past year. Patient has been eating smaller portion sizes and only twice a day. Patient reports usual weight is 225 lbs. Patient is mildly malnourished. Patient is eating well since admission with % p.o intakes. Malnutrition Assessment:  Malnutrition Status:  Mild malnutrition    Context:  Chronic Illness     Findings of the 6 clinical characteristics of malnutrition:  Energy Intake:  7 - 75% or less estimated energy requirements for 1 month or longer  Weight Loss:  1 - Mild weight loss (specify amount and time period) (18.6% loss in 1 yr)     Body Fat Loss:  Unable to assess     Muscle Mass Loss:  Unable to assess    Fluid Accumulation:  No significant fluid accumulation Extremities   Strength:  Not Performed    Estimated Daily Nutrient Needs:  Energy (kcal):  2004-4506 kcal (25-27 kcal/kg); Weight Used for Energy Requirements:  Current     Protein (g):  100-109 gm of protein (1.2-1.3 gm/kg); Weight Used for Protein Requirements:  Current            Nutrition Related Findings:  No edema. Active bowel sounds. Right jaw tooth pain for possible abscess      Wounds:  None       Current Nutrition Therapies:    ADULT DIET; Regular;  No Added Salt (3-4 gm)    Anthropometric Measures:  · Height: 6' 3\" (190.5 cm)  · Current Body Weight: 184 lb (83.5 kg)   · Admission Body Weight: 184 lb (83.5 kg)    · Usual Body Weight: 225 lb (102.1 kg)     · Ideal Body Weight: 196 lbs; % Ideal Body Weight 93.9 %   · BMI: 23  · BMI Categories: Normal Weight (BMI 22.0 to 24.9) age over 72 Nutrition Diagnosis:   · Mild malnutrition related to inadequate protein-energy intake as evidenced by intake 51-75%, weight loss      Nutrition Interventions:   Food and/or Nutrient Delivery:  Continue Current Diet  Nutrition Education/Counseling:  Education not indicated   Coordination of Nutrition Care:  Continue to monitor while inpatient    Goals:  PO intakes are greater than 75% at meals       Nutrition Monitoring and Evaluation:   Food/Nutrient Intake Outcomes:  Food and Nutrient Intake  Physical Signs/Symptoms Outcomes:  Biochemical Data, Fluid Status or Edema, Skin, Weight     Discharge Planning:    Continue current diet         Ruben SOSAN, RDN, LDN  Lead Clinical Dietitian  RD Office Phone [phone]

## 2021-10-12 NOTE — PROGRESS NOTES
Physical Therapy    [facility]  Initial Assessment    NAME: Leonardo Chung  : 1945  MRN: [id number]    Date of Service: 10/12/2021    Discharge Recommendations:  Patient would benefit from continued therapy after discharge            Pt presented to ED on 10/11/21complaining of dizziness and nausea. He had an apparent syncopal episode at home. Pt was just lying in bed watching TV when it happened. He relates he is not having any pain and did not have any pain at the time. He says he did not fall down or hit his head. He tells me he currently is not feeling so dizzy but he still feels nauseous. Pt admitted to the hospital for the management of cardiogenic syncope. RN reports patient is medically stable for therapy treatment this date. Chart reviewed prior to treatment and patient is agreeable for therapy. Assessment   Body structures, Functions, Activity limitations: Decreased functional mobility ; Decreased ADL status; Decreased safe awareness;Decreased balance;Decreased endurance  Assessment: Pt with deficits of bed mobility, transfers, ambulation, balance, safety awareness and endurance this session,, & is decline compared to prior level of function. Pt presenting with new neuromuscular dysfunction and would benefit from additional therapy at time of discharge. Please refer to the AM-PAC score for current functional status. Prognosis: Good  Decision Making: Medium Complexity  Exam: ROM, MMT, functional mobility, activity tolerance, Balance, & [employer] AM-PAC 6 Clicks Basic Mobility  Clinical Presentation: evolving  PT Education: Goals;PT Role;Plan of Care; Functional Mobility Training;Transfer Training;General Safety;Gait Training  REQUIRES PT FOLLOW UP: Yes  Activity Tolerance  Activity Tolerance: Other  Activity Tolerance: pt with impaired balance       Patient Diagnosis(es): The primary encounter diagnosis was Syncope and collapse.  A diagnosis of Bradycardia was also pertinent to this visit. has a past medical history of CAD (coronary artery disease). has no past surgical history on file.     Restrictions  Restrictions/Precautions  Restrictions/Precautions: General Precautions, Fall Risk  Position Activity Restriction  Other position/activity restrictions: telemetry, up with assist, RUE IV, LUPE diet, alarms  Vision/Hearing  Vision: Impaired  Vision Exceptions: Wears glasses at all times (Pt denies vision changes, has had Bravo cataract surgeries per spouse)  Hearing: Exceptions to [facility]  Hearing Exceptions: Hard of hearing/hearing concerns     Subjective  General  Chart Reviewed: Yes  Patient assessed for rehabilitation services?: Yes  Additional Pertinent Hx: HTN, HLPD, CAD  Response To Previous Treatment: Not applicable  General Comment  Comments: RN okays PT  Subjective  Subjective: Pt agreeable to PT  Pain Screening  Patient Currently in Pain: Yes  Pain Assessment  Pain Assessment: 0-10 (8/10)  Pain Location: Jaw  Pain Orientation: Right  Vital Signs  Patient Currently in Pain: Yes       Orientation  Orientation  Overall Orientation Status: Within Functional Limits  Social/Functional History  Social/Functional History  Lives With: Spouse  Type of Home: House (townhouse)  Home Layout: Two level (laundry on main)  Home Access: Stairs to enter without rails  Entrance Stairs - Number of Steps: one from garage  Bathroom Shower/Tub: Tub/Shower unit  [employer]: Standard (has vanity support near toilet he can hold if needed)  Bathroom Equipment:  (no equipment)  Home Equipment: [employer] Help From: Family (spouse is supportive & retired)  ADL Assistance: Independent  Homemaking Assistance: Needs assistance (Pt states he does laundry, spouse does all other IADL's)  Ambulation Assistance: Independent  Transfer Assistance: Independent  Active : Yes  Mode of Transportation: Car  Occupation: Retired  Type of occupation: [address] [place]  Leisure & Hobbies: sports on TV  Additional Comments: Pt denies falls. Spouse states pt was sitting on the EOB and fell backwards in bed, was sweating, tensed up, and with loss of bladder control. Cognition   Cognition  Overall Cognitive Status: Exceptions  Arousal/Alertness: Appropriate responses to stimuli  Following Commands: Follows all commands without difficulty  Attention Span: Appears intact  Memory: Decreased short term memory  Safety Judgement: Decreased awareness of need for safety;Decreased awareness of need for assistance  Problem Solving: Assistance required to identify errors made;Assistance required to correct errors made;Decreased awareness of errors  Insights: Decreased awareness of deficits  Initiation: Requires cues for some  Sequencing: Requires cues for some    Objective     Observation/Palpation  Posture: Good  Observation: RUE IV, pt with flat affect, slight dysarthria noted. Pt with no c/o dizziness this session. Edema: R jaw    AROM RLE (degrees)  RLE AROM: WFL  AROM LLE (degrees)  LLE AROM : WFL  AROM RUE (degrees)  RUE General AROM: see OT assessment  AROM LUE (degrees)  LUE General AROM: see OT assessment  Strength RLE  Strength RLE: WFL  Strength LLE  Strength LLE: WFL  Strength RUE  Comment: see OT assessment  Strength LUE  Comment: see OT assessment  Tone RLE  RLE Tone: Normotonic  Tone LLE  LLE Tone: Normotonic  Motor Control  Gross Motor?: WFL  Sensation  Overall Sensation Status: WFL  Bed mobility  Supine to Sit: Contact guard assistance  Scooting: Contact guard assistance  Comment: MIN cues for hand placement on bed rail as needed, pacing & pursed lip breathing tech, and use of BUE's to scoot fully out to EOB as well as awareness/assist with lines to increase safety.   Transfers  Sit to Stand: Minimal Assistance  Stand to sit: Minimal Assistance  Bed to Chair: Minimal assistance  Stand Pivot Transfers: Minimal Assistance  Lateral Transfers: Minimal Assistance  Comment: Ed  on correct use of upper body for safe sit/stand + to back all way back to surface & feels surface behind his legs before he reaches to arms of chair  Ambulation  Ambulation?: Yes  More Ambulation?: Yes  Ambulation 1  Surface: level tile  Device: No Device  Quality of Gait: step to pattern, wide ARTURO & some unsteadiness, cues to slow pace for safety  Gait Deviations: Decreased step length;Decreased step height  Distance: 25ft  Ambulation 2  Surface - 2: level tile  Device 2: No device  Assistance 2: Minimal assistance  Quality of Gait 2: step through pattern but has some unsteadiness with mild LOB to R  Gait Deviations: Slow Magali; Increased ARTURO; Decreased step length;Decreased step height;Decreased arm swing  Distance: [id number]     Balance  Sitting - Static: Good  Sitting - Dynamic: Good  Standing - Static: Good;-  Standing - Dynamic: Fair  Single Leg Stance R Le  Single Leg Stance L Le  Exercises  Comments: Ed to keep legs moving to maintain strength & joint congruency to prevent sedentary complications    All lines intact, call light within reach, and patient positioned comfortably at end of treatment. All patient needs addressed prior to ending therapy session.         Plan   Plan  Times per week: 1-2x/D, 5-6D/week  Current Treatment Recommendations: Strengthening, ROM, Balance Training, Functional Mobility Training, Endurance Training, Gait Training, Home Exercise Program, Safety Education & Training, Patient/Caregiver Education & Training, ADL/Self-care Training, Neuromuscular Re-education  Safety Devices  Type of devices: Gait belt, Patient at risk for falls, Left in chair, Chair alarm in place, Nurse notified    G-Code       OutComes Score                                                  AM-PAC Score  AM-PAC Inpatient Mobility Raw Score : 15 (10/12/21 1012)  AM-PAC Inpatient T-Scale Score : 39.45 (10/12/21 1012)  Mobility Inpatient CMS 0-100% Score: 57.7 (10/12/21 1012)  Mobility Inpatient CMS G-Code Modifier : CK (10/12/21 1012)          Goals  Short term goals  Time Frame for Short term goals: 12 visits  Short term goal 1: Inc bed-mobility & transfers to independent to enable pt to safely get in/OOB & chair  Short term goal 2: Inc gait to amb 350ft or > indep w/ safest device to enable pt to return to previous level of independence  Short term goal 3:  Inc standing balance to good with device to facilitate pt independence for performance of ADL's & functional mobility, & reduce fall risk  Short term goal 4: Pt able to tolerate 30-40 min of activity to include 15-20 reps of ex, NMR & functional mobility to facilitate activity tolerance to [facility]  Short term goal 5: Ed pt on home ex's, safety & balance, fall prevention, & issue written home program       Therapy Time   Individual Concurrent Group Co-treatment   Time In 1013         Time Out 1052         Minutes 39+10=49              Additional 10 minutes for chart review      Treatment time: 30 minutes      YULISA KERN, PT

## 2021-10-12 NOTE — CARE COORDINATION
Case Management Initial Discharge Plan  Kalyan Hui,         Readmission Risk              Risk of Unplanned Readmission:  10             Met with:patient to discuss discharge plans. Information verified: address, contacts, phone number, , insurance Yes  PCP: at [facility]   Date of last visit: few months    Insurance Provider: Medicare A&B and [facility]    Discharge Planning  Current Residence: [facility]   Living Arrangements:  Spouse/Significant Other       Home has 2 stories/1 stairs to climb. bedroom upstairs and bath on both levels   Support Systems:  Spouse/Significant Other       Current Services PTA:  None  Agency: none       Patient able to perform ADL's:Assisted  DME in home:  [employer]   DME used to aid ambulation prior to admission:   None   DME used during admission:  None     Potential Assistance Needed:  N/A    [facility] Medications:  No  Does patient want to participate in local refill/ meds to beds program?  Not Assessed    Patient agreeable to home care: No  [place] of choice provided:  n/a      Type of Home Care Services:  [facility]  Patient expects to be discharged to:   home     Prior SNF/Rehab Placement and Facility: none   Agreeable to SNF/Rehab: No  [place] of choice provided: n/a   Evaluation: n/a    Expected Discharge date:  10/15/21  Follow Up Appointment: Best Day/ Time:   any     Transportation provider: per wife   Transportation arrangements needed for discharge: No    Discharge Plan:   Met with patient and his wife, Shana Sidhu, at the bedside and completed a discharge assessment. When asking questions wife answered most and patient seemed to defer to her. He did speak up and tell writer that he wanted his mouth checked. Patient has swelling to right lower jaw area. May need dental and will provide with dental clinic list.     Patient otherwise states pretty independent in his ADLs. Uses cane when needd.  He has never used home care nor snf in past. He is admitted with syncope and will follow work up. Patient does go to [place] and call to [place] and verified his pcp is DR Mamta Alston and updated face sheet. On dc please fax dc summary.  Labs, radiology along with consults to her office at [phone]    Cardiology consulted possible pacemaker per attending notes today    Electronically signed by Napoleon Perdomo RN on 10/12/21 at 2:23 PM EDT

## 2021-10-12 NOTE — ACP (ADVANCE CARE PLANNING)
Existing advance directive reviewed with patient; no changes to patient's previously recorded wishes  [] New Advance Directive completed  [] Portable Do Not Rescitate prepared for Provider review and signature  [] POLST/POST/MOLST/MOST prepared for Provider review and signature      Follow-up plan:    [] Schedule follow-up conversation to continue planning  [] Referred individual to Provider for additional questions/concerns   [] Advised patient/agent/surrogate to review completed ACP document and update if needed with changes in condition, patient preferences or care setting    [x] This note routed to one or more involved healthcare providers

## 2021-10-13 VITALS
WEIGHT: 184.7 LBS | DIASTOLIC BLOOD PRESSURE: 82 MMHG | HEART RATE: 78 BPM | RESPIRATION RATE: 18 BRPM | OXYGEN SATURATION: 97 % | TEMPERATURE: 98.6 F | HEIGHT: 75 IN | SYSTOLIC BLOOD PRESSURE: 150 MMHG | BODY MASS INDEX: 22.97 KG/M2

## 2021-10-13 LAB
ANION GAP SERPL CALCULATED.3IONS-SCNC: 10 MMOL/L (ref 9–17)
BUN BLDV-MCNC: 11 MG/DL (ref 8–23)
BUN/CREAT BLD: 13 (ref 9–20)
CALCIUM SERPL-MCNC: 8.9 MG/DL (ref 8.6–10.4)
CHLORIDE BLD-SCNC: 101 MMOL/L (ref 98–107)
CO2: 24 MMOL/L (ref 20–31)
CREAT SERPL-MCNC: 0.87 MG/DL (ref 0.7–1.2)
GFR AFRICAN AMERICAN: >60 ML/MIN
GFR NON-AFRICAN AMERICAN: >60 ML/MIN
GFR SERPL CREATININE-BSD FRML MDRD: ABNORMAL ML/MIN/{1.73_M2}
GFR SERPL CREATININE-BSD FRML MDRD: ABNORMAL ML/MIN/{1.73_M2}
GLUCOSE BLD-MCNC: 121 MG/DL (ref 70–99)
MAGNESIUM: 2 MG/DL (ref 1.6–2.6)
POTASSIUM SERPL-SCNC: 3.7 MMOL/L (ref 3.7–5.3)
SODIUM BLD-SCNC: 135 MMOL/L (ref 135–144)

## 2021-10-13 PROCEDURE — 99232 SBSQ HOSP IP/OBS MODERATE 35: CPT | Performed by: INTERNAL MEDICINE

## 2021-10-13 PROCEDURE — 97535 SELF CARE MNGMENT TRAINING: CPT

## 2021-10-13 PROCEDURE — APPNB30 APP NON BILLABLE TIME 0-30 MINS: Performed by: NURSE PRACTITIONER

## 2021-10-13 PROCEDURE — APPSS45 APP SPLIT SHARED TIME 31-45 MINUTES: Performed by: NURSE PRACTITIONER

## 2021-10-13 PROCEDURE — 6370000000 HC RX 637 (ALT 250 FOR IP): Performed by: STUDENT IN AN ORGANIZED HEALTH CARE EDUCATION/TRAINING PROGRAM

## 2021-10-13 PROCEDURE — 33285 INSJ SUBQ CAR RHYTHM MNTR: CPT | Performed by: INTERNAL MEDICINE

## 2021-10-13 PROCEDURE — 2500000003 HC RX 250 WO HCPCS

## 2021-10-13 PROCEDURE — C1764 EVENT RECORDER, CARDIAC: HCPCS

## 2021-10-13 PROCEDURE — 2580000003 HC RX 258: Performed by: NURSE PRACTITIONER

## 2021-10-13 PROCEDURE — 83735 ASSAY OF MAGNESIUM: CPT

## 2021-10-13 PROCEDURE — 80048 BASIC METABOLIC PNL TOTAL CA: CPT

## 2021-10-13 PROCEDURE — 6360000002 HC RX W HCPCS: Performed by: NURSE PRACTITIONER

## 2021-10-13 PROCEDURE — 6370000000 HC RX 637 (ALT 250 FOR IP): Performed by: NURSE PRACTITIONER

## 2021-10-13 PROCEDURE — 0JH602Z INSERTION OF MONITORING DEVICE INTO CHEST SUBCUTANEOUS TISSUE AND FASCIA, OPEN APPROACH: ICD-10-PCS | Performed by: INTERNAL MEDICINE

## 2021-10-13 PROCEDURE — 36415 COLL VENOUS BLD VENIPUNCTURE: CPT

## 2021-10-13 PROCEDURE — 97110 THERAPEUTIC EXERCISES: CPT

## 2021-10-13 RX ORDER — ASPIRIN 81 MG/1
81 TABLET, CHEWABLE ORAL DAILY
Qty: 30 TABLET | Refills: 3 | Status: SHIPPED | OUTPATIENT
Start: 2021-10-14

## 2021-10-13 RX ORDER — CLINDAMYCIN HYDROCHLORIDE 300 MG/1
300 CAPSULE ORAL 4 TIMES DAILY
Qty: 40 CAPSULE | Refills: 0 | Status: SHIPPED | OUTPATIENT
Start: 2021-10-13 | End: 2021-10-23

## 2021-10-13 RX ORDER — NIFEDIPINE 30 MG/1
30 TABLET, FILM COATED, EXTENDED RELEASE ORAL DAILY
Qty: 30 TABLET | Refills: 3 | Status: SHIPPED | OUTPATIENT
Start: 2021-10-14

## 2021-10-13 RX ADMIN — ATORVASTATIN CALCIUM 20 MG: 20 TABLET, FILM COATED ORAL at 09:27

## 2021-10-13 RX ADMIN — ASPIRIN 81 MG: 81 TABLET, CHEWABLE ORAL at 09:27

## 2021-10-13 RX ADMIN — CLINDAMYCIN HYDROCHLORIDE 300 MG: 150 CAPSULE ORAL at 12:29

## 2021-10-13 RX ADMIN — NIFEDIPINE 30 MG: 30 TABLET, FILM COATED, EXTENDED RELEASE ORAL at 09:27

## 2021-10-13 RX ADMIN — SODIUM CHLORIDE, PRESERVATIVE FREE 10 ML: 5 INJECTION INTRAVENOUS at 10:39

## 2021-10-13 RX ADMIN — CLINDAMYCIN HYDROCHLORIDE 300 MG: 150 CAPSULE ORAL at 05:43

## 2021-10-13 RX ADMIN — LISINOPRIL 40 MG: 40 TABLET ORAL at 09:27

## 2021-10-13 RX ADMIN — ENOXAPARIN SODIUM 40 MG: 40 INJECTION SUBCUTANEOUS at 09:27

## 2021-10-13 ASSESSMENT — PAIN DESCRIPTION - PROGRESSION
CLINICAL_PROGRESSION: GRADUALLY IMPROVING

## 2021-10-13 ASSESSMENT — PAIN SCALES - GENERAL
PAINLEVEL_OUTOF10: 3
PAINLEVEL_OUTOF10: 2
PAINLEVEL_OUTOF10: 0

## 2021-10-13 ASSESSMENT — PAIN DESCRIPTION - ORIENTATION: ORIENTATION: RIGHT

## 2021-10-13 ASSESSMENT — PAIN DESCRIPTION - LOCATION: LOCATION: JAW

## 2021-10-13 NOTE — PROGRESS NOTES
Occupational Therapy  Facility/Department: [facility] PROGRESSIVE CARE  Daily Treatment Note  NAME: Desirae Gresham  : 1945  MRN: [id number]    Date of Service: 10/13/2021     TRAVON Ramirez reports patient is medically stable for therapy treatment this date. Chart reviewed prior to treatment and patient is agreeable for therapy. All lines intact and patient positioned comfortably at end of treatment. All patient needs addressed prior to ending therapy session. Due to recent hospitalization and medical condition, pt would benefit from additional therapy at time of discharge to ensure safety. Please refer to the AM-PAC score for current functional status. Discharge Recommendations:  Patient would benefit from continued therapy after discharge  OT Equipment Recommendations  ADL Assistive Devices: Toileting - 3-in-1 Commode;Grab Bars - shower; Reacher;Long-handled Shoe Horn;Long-handled Sponge;Emergency Alert System    Assessment   Performance deficits / Impairments: Decreased functional mobility ; Decreased safe awareness;Decreased balance;Decreased endurance;Decreased high-level IADLs  Assessment: Pt progressing but is still limited by decreased safety awareness, balance, and dec act davida. Skilled OT is recommended to increase overall balance and act davida to reduce fall risk and improve functional I and safety as able to return home with family assist as needed. Prognosis: Good  OT Education: Family Education;OT Role;Transfer Training;Plan of Care;Energy Conservation  Patient Education: pursed lip breathing, safety in function, call light use/fall prevention, recommendations for continued therapy, UB theraband HEP, for slowing down when walking, and visual scanning for increased safety/decreased risk of falls. Diagnosis specific edu on slow controlled transfers and keeping head level and eyes forward when standing for decreased dizziness.   REQUIRES OT FOLLOW UP: Yes  Activity Tolerance  Activity Tolerance: Patient Tolerated treatment well  Activity Tolerance: fair         Patient Diagnosis(es): The primary encounter diagnosis was Syncope and collapse. A diagnosis of Bradycardia was also pertinent to this visit. has a past medical history of CAD (coronary artery disease). has no past surgical history on file. Restrictions  Restrictions/Precautions  Restrictions/Precautions: General Precautions, Fall Risk  Position Activity Restriction  Other position/activity restrictions: telemetry, up with assist, RUE IV, LUPE diet, alarms  Subjective   General  Chart Reviewed: Yes  Patient assessed for rehabilitation services?: Yes  Family / Caregiver Present: No  Subjective  Subjective: \"I am not feeling too bad today\"  Pain Assessment  Pain Level: 2  Vital Signs  Patient Currently in Pain: Yes (Minor messer pain. Pt expecting it to be worse after eating breakfast.)   Orientation  Orientation  Overall Orientation Status: Within Functional Limits  Objective    ADL  Grooming: Contact guard assistance;Setup (Performed oral care at bathroom sink. CGA for safety)  Additional Comments: Pt denied participating in bed bath verbalizing \"I want to wait until later when my wife brings my regular clothes. \" Pt required Min vc's for slowing down when walking, slow controlled transfers and keeping head level and eyes forward when standing for decreased dizziness, and visual scanning for increased safety/decreased risk of falls. Balance  Sitting Balance: Stand by assistance  Standing Balance: Contact guard assistance  Standing Balance  Time: stand davida > 5 mins  Activity: ambulating to bathroom and standing for oral care, ambulating in room  Functional Mobility  Functional - Mobility Device: No device  Assist Level: Contact guard assistance  Functional Mobility Comments: Pt ambulated from bed to bathroom and then walked from bathroom to front door in room 3x. Pt was ambulating slightly fast and had 1 LOB walking into bed.  Pt was edu on slowing down and scanning room. Pt was able to correct and did not have any LOBs after and retired to chair afterwards. Pt required Min vc's for slowing down when walking, slow controlled transfers and keeping head level and eyes forward when standing for decreased dizziness, and visual scanning for increased safety/decreased risk of falls. Bed mobility  Supine to Sit: Stand by assistance  Sit to Supine: Unable to assess (pt agreed to sit up in chair after edu on the benefits of being up OOB as able)  Comment: Pt used proper technique and good use of bed rails. Min vc's for slower pace when transferring to sitting EOB. Transfers  Sit to stand: Contact guard assistance  Transfer Comments: Pt required Min vc's for slowing down when walking, slow controlled transfers and keeping head raised, and visual scanning for increased safety/decreased risk of falls. Access Code: [email or web address]  URL: [email or web address]  Date: 10/13/2021  Prepared by: Bandar Cosbych II    Exercises  Seated Shoulder Horizontal Abduction with Resistance - 1 x daily - 7 x weekly - 3 sets - 10 reps  Seated Elbow Flexion with Self-Anchored Resistance - 1 x daily - 7 x weekly - 3 sets - 10 reps  Seated Shoulder Flexion with Self-Anchored Resistance - 1 x daily - 7 x weekly - 3 sets - 10 reps  Seated Single Shoulder PNF D2 with Resistance - 1 x daily - 7 x weekly - 3 sets - 10 reps  Pt given verbal/visual demo for UB HEP. Pt able to display carryover of exercises back to writer requiring Min-Mod A for correction of technique. Pt completed 1 set of each exercise for  10 reps each. Pt administered handout of UB HEP. Cognition  Overall Cognitive Status: Exceptions  Arousal/Alertness: Appropriate responses to stimuli  Following Commands:  Follows all commands without difficulty  Attention Span: Appears intact  Memory: Decreased short term memory  Safety Judgement: Decreased awareness of need for safety;Decreased awareness of need for Minutes 38                 Upon writer exit, call light within reach, pt retired to chair. All lines intact and patient positioned comfortably. Chair alarm in place. All patient needs addressed prior to ending therapy session. Chart reviewed prior to treatment and patient is agreeable for therapy. RN reports patient is medically stable for therapy treatment this date.     Cara Rivera

## 2021-10-13 NOTE — OP NOTE
Operative Note      Patient: Kj Olivera  YOB: 1945  MRN: [id number]    PROCEDURE:  Loop Recorder Implantation    PERFORMED BY:  Te Selby M.D. INDICATIONS:syncope  Brief history: The risks, indications, benefits, as well as alternatives of the procedure  had been discussed with the patient. The risks include bleeding,  infection, and chronic pain at the site. The patient understood and wished  to proceed. The area of the left parasternal region was infiltrated with 1% lidocaine. A 1cm incision was made and the loop recorder was implanted in the left  parasternal region. This was a [employer], serial number  [id number]. Sensing was 0.4mV. The patient tolerated the procedure well without any apparent complications. EBL-N/A    IMPRESSION:  Successful implantation of a loop recorder for syncope.         Electronically signed by Te Selby MD on 10/13/2021 at 1:06 PM

## 2021-10-13 NOTE — PLAN OF CARE
Problem: Cardiac:  Goal: Ability to maintain vital signs within normal range will improve  Description: Ability to maintain vital signs within normal range will improve  Outcome: Ongoing  Note: Monitor vital signs at least every shift & as needed. Monitor intake & output at least every shift. Goal: Cardiovascular alteration will improve  Description: Cardiovascular alteration will improve  Outcome: Ongoing  Note: Assess cardiac monitor, heart rate, & pulse. Assess for any arrhythmias. Assessed IV administration. O2 provided as needed. Problem: Physical Regulation:  Goal: Complications related to the disease process, condition or treatment will be avoided or minimized  Description: Complications related to the disease process, condition or treatment will be avoided or minimized  Outcome: Ongoing  Note: Assessed temperature for fever or for hypothermia signs or symptoms. Implemented protocol for either fever or hypothermia. Problem: Falls - Risk of:  Goal: Will remain free from falls  Description: Will remain free from falls  Outcome: Ongoing  Note: Pt fall risk, fall band present, falling star, safety alarm activated and in use as needed. Hourly rounding performed. Pt encouraged to use call light. See Hollis Rahman fall risk assessment. Goal: Absence of physical injury  Description: Absence of physical injury  Outcome: Ongoing  Note: Non-skid socks in place, up with assistance, bed in lowest position, bed exit & alarm as needed, provide toileting every 2 hours an d as needed. Problem: Pain:  Goal: Pain level will decrease  Description: Pain level will decrease  Outcome: Ongoing  Note: Monitoring pain with each assessment and prn. JOSE E 0-10 pain scale utilized. Non-pharmacological measures to be encouraged prior to pharmacological measures.     Goal: Control of acute pain  Description: Control of acute pain  Outcome: Ongoing  Goal: Control of chronic pain  Description: Control of chronic pain  Outcome: Ongoing     Problem: Nutrition  Goal: Optimal nutrition therapy  Outcome: Ongoing  Note: Review diet daily and as needed with pt. Provide supplement nutrition as ordered by physician & educate pt. Review nutritional guidelines with pt. Problem: IP BALANCE  Goal: LTG - Patient will maintain balance to allow for safe/functional mobility  10/13/2021 0016 by Kenneth Resendiz RN  Outcome: Ongoing  Note: Pt able to utilize fine body movements, including, but not limited to dressing without assistance.    10/12/2021 1404 by Jayjay Elliott OT  Outcome: Ongoing

## 2021-10-13 NOTE — PROGRESS NOTES
Pt. Back from loop implantation via stretcher. c/o of no pain. The site is clean with steri-strips applied. Cath nurse explained to pt. and wife Michela Espana how to use and how it's recorded. Written information provided as well.

## 2021-10-13 NOTE — PROGRESS NOTES
.All patient belongings collected. IV and telemetry removed. Discharge paperwork given and explained to patient and patients wife. Wife requested that nicotine patch be removed prior to DC as patient plans on smoking as soon as he goes home. Patient discharged home with no home care needs. Patient wheeled out to private vehicle by staff with patients wife. Education provided over loop recorder and importance of following up with cardiology and PCP. Verbalized understanding.

## 2021-10-13 NOTE — CARE COORDINATION
Discharge Planning:    Faxed face sheet, H+P, DC Summar, Labs, Radiology imaging, and consults to [phone] [facility]).

## 2021-10-13 NOTE — PROGRESS NOTES
[facility]  Office: [address], DO, Rosita Nathan, DO, Mouna Duane, DO, Matthias Reid Blood, DO, Adan Joyce MD, Andreas Zepeda MD, Maria Del Rosario Mckeon MD, Marichuy Adam MD, Radhika Booker MD, Marshall James MD, Lois Thapa MD, Uma Hebert, DO, Ankush Greenberg, DO, Melania Perez MD,  Katerin Morillo, DO, Josse Hunt MD, Karen Fernandez MD, Ashlie Fajardo MD, Hubert Mesa MD, Susy Gonsales MD, Lux Xavier MD, Elizabeth Heaton, [facility], [facility] Dowd, CNP, Garett Chamorro, CNP, Tanda Lesches, CNS, Danielle Camargo, CNP, Annabelle Carrasco, CNP, Radha Halltt, CNP, Susan Love, CNP, Delbert Aase, CNP, Giorgi Boone PA-C, Sandra Amos, [facility], Nadia Pitt, CNP, Oscar Porter, CNP, Corine Chase, CNP, Nataliia Thomas, CNP, Benito Kendall, [facility], [facility], [facility], Danielle Davidson, [facility]    Progress Note    10/13/2021    10:32 AM    Name:   Aranza Kirby  MRN:     [id number]     Acct:      [de-identified]   Room:   [address] [place] [address] Day:  2  Admit Date:  10/11/2021  4:46 AM    PCP:   Charles Solorzano MD  Code Status:  Full Code    Subjective:     C/C:   Chief Complaint   Patient presents with    Dizziness     Interval History Status: improved. Condition is improved again overnight. No syncope. Cardiology on board. No intention for inpatient intervention, recommendations provided by cardiology. Mandibular swelling improved with clindamycin. Recommend outpatient dental evaluation. Stable for discharge. Brief History:     10/11 - Patient reported that over the past 48 hours she has been having a \"empty\" feeling in his chest multiple times has been sitting or lying down and will suddenly become very dizzy and passed out. Patient reports she has never had any symptoms like this before and they have not happened unprovoked.  Telemetry monitoring did not capture multiple episodes of profound bradycardia with ventricular escape beats (Oral)   Resp 14   Ht 6' 3\" (1.905 m)   Wt 184 lb 11.2 oz (83.8 kg)   SpO2 98%   BMI 23.09 kg/m²   Temp (24hrs), Av.9 °F (37.2 °C), Min:98.4 °F (36.9 °C), Max:99.7 °F (37.6 °C)    No results for input(s): POCGLU in the last 72 hours. I/O (24Hr): Intake/Output Summary (Last 24 hours) at 10/13/2021 1032  Last data filed at 10/13/2021 0545  Gross per 24 hour   Intake 500 ml   Output 300 ml   Net 200 ml       Labs:  Hematology:  Recent Labs     10/11/21  0450 10/12/21  0530   WBC 5.7 6.1   RBC 5.74 5.55   HGB 15.2 14.3   HCT 47.4 45.9   MCV 82.7 82.7   MCH 26.5 25.8   MCHC 32.1 31.2   RDW 14.2 13.9    176   MPV 7.6 9.4   INR 1.0  --      Chemistry:  Recent Labs     10/11/21  0450 10/11/21  0725 10/12/21  0530 10/13/21  0539     --  140 135   K 3.7  --  4.0 3.7     --  103 101   CO2 26  --  28 24   GLUCOSE 139*  --  104* 121*   BUN 14  --  15 11   CREATININE 1.20  --  1.26* 0.87   MG  --   --  2.0 2.0   ANIONGAP 11  --  9 10   LABGLOM 59*  --  56* >60   GFRAA >60  --  >60 >60   CALCIUM 9.2  --  8.9 8.9   PROBNP 112  --   --   --    TROPHS 11 13 21  --      Recent Labs     10/11/21  0450 10/12/21  05   PROT 7.1  --    LABALBU 4.0  --    TSH  --  2.35   AST 15  --    ALT 10  --    ALKPHOS 62  --    BILITOT 0.40  --    AMYLASE 88  --    LIPASE 25  --      ABG:No results found for: POCPH, PHART, PH, POCPCO2, EVP3CIC, PCO2, POCPO2, PO2ART, PO2, POCHCO3, WTQ1XHX, HCO3, NBEA, PBEA, BEART, BE, THGBART, THB, VGC4LVX, WEDX0HSG, L0CXDFMS, O2SAT, FIO2  No results found for: SPECIAL  No results found for: CULTURE    Radiology:  CT Head WO Contrast    Result Date: 10/11/2021  No acute intracranial abnormality. XR CHEST PORTABLE    Result Date: 10/11/2021  Normal examination.        Physical Examination:        General appearance:  alert, cooperative and no distress  Mental Status:  oriented to person, place and time and normal affect  Lungs:  clear to auscultation bilaterally, normal effort  Heart:  Rate and Rhythm: Bradycardia resolved. Rhythm irregular. Abdomen:  soft, nontender, nondistended, normal bowel sounds, no masses, hepatomegaly, splenomegaly  Extremities:  no edema, redness, tenderness in the calves  Skin:  no gross lesions, rashes, induration    Assessment:        Hospital Problems         Last Modified POA    * (Principal) Syncope, cardiogenic 10/11/2021 Yes    CAD (coronary artery disease) 10/11/2021 Yes    Overview Signed 10/11/2021  2:44 PM by SHOAIB Cassidy NP     MI with stent approx 12 years         Hyperlipemia 10/11/2021 Yes    Hypertension 10/11/2021 Yes    Mild malnutrition ([address] 10/12/2021 Yes          Plan:        1. Cardiogenic syncope secondary to bradycardia with a personal history of coronary disease, hyperlipidemia, hypertension  1. Rate well controlled at this time, no indication for acute intervention  2. Cardiac echo reviewed  3. Cardiology consultation recommends holding beta-blocker and continued outpatient follow-up  2. Mandibular swelling and pain -significantly improved overnight  1. Clindamycin  2.  Recommend outpatient follow-up with dental services        SHOAIB Cassidy NP  10/13/2021  10:32 AM

## 2021-10-13 NOTE — DISCHARGE SUMMARY
[facility]  Office: [address], DO, Bria Kenney, DO, Slava Chatman, DO, Deandra Janell [facility], DO, Alejandra Wang MD, Evita Nice MD, Alicia Hernandez MD, Kalie Farah MD, Adan Verduzco MD, Barbara Valverde MD, Rose Mary Crowder MD, Urbano Cardoza MD, Gregg Masters, DO, Kimani Matta, DO, Janis Espinal MD,  Sreekanth Dow, DO, Theodora Longoria MD, Rishabh Strong MD, Maury Leon MD, Lieutenant Spencer MD, Jonathan Carr MD, Jana Sibley MD, Tal Nelson, [facility], [facility], CNP, Kelechi Garcia, CNP, Natalie Martinez, CNS, Donn Santiago, CNP, Dez Hampton, CNP, Erlin Horn, CNP, Marshall Bone, CNP, Tatiana Sim, CNP, Guillaume Anglin, CNP, Adarsh Justice PA-C, Rae Oakley, [facility], Uziel Escudero, CNP, Jay Story, CNP, Shawn Branham, CNP, Jluis Chadwick, CNP, Cristy Fried, CNP, Diaz Hui, CNP, [facility]    Discharge Summary     Patient ID: Ata Berkowitz  :  1945   MRN: [id number]     ACCOUNT:  [de-identified]   Patient's PCP: Kelly Bose MD  Admit Date: 10/11/2021   Discharge Date: 10/13/2021     Length of Stay: 2  Code Status:  Full Code  Admitting Physician: Jesus Ariza DO  Discharge Physician: SHOAIB Lynne NP     Active Discharge Diagnoses:     Hospital Problem Lists:  Principal Problem:    Syncope, cardiogenic  Active Problems:    CAD (coronary artery disease)    Hyperlipemia    Hypertension    Mild malnutrition ([address]  Resolved Problems:    * No resolved hospital problems.  *      Admission Condition:  fair     Discharged Condition: good    Hospital Stay:     Hospital Course:  Ata Berkowitz is a 78 y.o. male who was admitted for the management of  Syncope, cardiogenic , presented to ER with Dizziness    10/11 - Patient reported that over the past 48 hours she has been having a \"empty\" feeling in his chest multiple times has been sitting or lying down and will suddenly become very dizzy and passed out.  Patient reports she has never had any symptoms like this before and they have not happened unprovoked. Telemetry monitoring did not capture multiple episodes of profound bradycardia with ventricular escape beats and bigeminy.     10/12-10/13 -condition improved overnight. No syncope. Cardiology on board. No intention for inpatient intervention, recommendations provided by cardiology. Mandibular swelling improved with clindamycin. Recommend outpatient dental evaluation. Stable for discharge    10/13 -cardiology would like to place a loop recorder this admission. Patient will be held until loop recorder is placed and then be discharged with outpatient follow-up. Significant therapeutic interventions: ECHO, cardiology consultation, alteration of medication regimen including discontinuing beta-blocker, treatment for dental infection.     Significant Diagnostic Studies:   Labs / Micro:  CBC:   Lab Results   Component Value Date    WBC 6.1 10/12/2021    RBC 5.55 10/12/2021    HGB 14.3 10/12/2021    HCT 45.9 10/12/2021    MCV 82.7 10/12/2021    MCH 25.8 10/12/2021    MCHC 31.2 10/12/2021    RDW 13.9 10/12/2021     10/12/2021     BMP:    Lab Results   Component Value Date    GLUCOSE 121 10/13/2021     10/13/2021    K 3.7 10/13/2021     10/13/2021    CO2 24 10/13/2021    ANIONGAP 10 10/13/2021    BUN 11 10/13/2021    CREATININE 0.87 10/13/2021    BUNCRER 13 10/13/2021    CALCIUM 8.9 10/13/2021    LABGLOM >60 10/13/2021    GFRAA >60 10/13/2021    GFR      10/13/2021    GFR NOT REPORTED 10/13/2021     U/A:    Lab Results   Component Value Date    COLORU Yellow 10/11/2021    TURBIDITY Clear 10/11/2021    SPECGRAV 1.020 10/11/2021    HGBUR NEGATIVE 10/11/2021    PHUR 6.0 10/11/2021    PROTEINU NEGATIVE 10/11/2021    GLUCOSEU NEGATIVE 10/11/2021    KETUA NEGATIVE 10/11/2021    BILIRUBINUR NEGATIVE 10/11/2021    UROBILINOGEN Normal 10/11/2021    NITRU NEGATIVE 10/11/2021 LEUKOCYTESUR NEGATIVE 10/11/2021        Radiology:  CT Head WO Contrast    Result Date: 10/11/2021  No acute intracranial abnormality. XR CHEST PORTABLE    Result Date: 10/11/2021  Normal examination. Consultations:    Consults:     Final Specialist Recommendations/Findings:   IP CONSULT TO SOCIAL WORK  IP CONSULT TO CARDIOLOGY      The patient was seen and examined on day of discharge and this discharge summary is in conjunction with any daily progress note from day of discharge. Discharge plan:     Disposition: Home    Physician Follow Up:     MD Felicia Monreal [address] [place] [id number]  [phone]    In 1 month      Adi Hong MD  [address]  [address], [address]  [phone]    In 2 weeks         Requiring Further Evaluation/Follow Up POST HOSPITALIZATION/Incidental Findings: Dental infection with poor dentition    Diet: regular diet    Activity: As tolerated    Instructions to Patient: Make and keep a follow-up appointment with a dentist to have the infection and dental concerns addressed. Make a follow-up appointment with cardiology at their recommendation to have your loop recorder evaluated.     Discharge Medications:      Medication List      START taking these medications    aspirin 81 MG chewable tablet  Take 1 tablet by mouth daily  Start taking on: October 14, 2021     clindamycin 300 MG capsule  Commonly known as: CLEOCIN  Take 1 capsule by mouth 4 times daily for 10 days     NIFEdipine 30 MG extended release tablet  Commonly known as: ADALAT CC  Take 1 tablet by mouth daily  Start taking on: October 14, 2021        Ca Moeller taking these medications    lisinopril 40 MG tablet  Commonly known as: PRINIVIL;ZESTRIL     simvastatin 40 MG tablet  Commonly known as: ZOCOR     vitamin B-12 1000 MCG tablet  Commonly known as: CYANOCOBALAMIN     vitamin D 25 MCG (1000 UT) Tabs tablet  Commonly known as: CHOLECALCIFEROL        STOP taking these medications    carvedilol 25 MG tablet  Commonly known as: COREG     sildenafil 100 MG tablet  Commonly known as: VIAGRA           Where to Get Your Medications      These medications were sent to [facility] [address] [place] [address], [address] [place] [address]    Phone: [phone]   · aspirin 81 MG chewable tablet  · clindamycin 300 MG capsule  · NIFEdipine 30 MG extended release tablet         No discharge procedures on file. Time Spent on discharge is  36 mins in patient examination, evaluation, counseling as well as medication reconciliation, prescriptions for required medications, discharge plan and follow up. Electronically signed by   Adelaide Brittle, APRN - NP  10/13/2021  11:23 AM      Thank you Dr. Basilia Hough MD for the opportunity to be involved in this patient's care.

## 2021-10-13 NOTE — PLAN OF CARE
Problem: Cardiac:  Goal: Ability to maintain vital signs within normal range will improve  Description: Ability to maintain vital signs within normal range will improve  10/13/2021 1106 by Diaz Hoyos RN  Outcome: Ongoing  10/13/2021 0016 by Kelle Capone RN  Outcome: Ongoing  Note: Monitor vital signs at least every shift & as needed. Monitor intake & output at least every shift. Problem: Cardiac:  Goal: Cardiovascular alteration will improve  Description: Cardiovascular alteration will improve  10/13/2021 1106 by Diaz Hoyos RN  Outcome: Ongoing  10/13/2021 0016 by Kelle Capone RN  Outcome: Ongoing  Note: Assess cardiac monitor, heart rate, & pulse. Assess for any arrhythmias. Assessed IV administration. O2 provided as needed. Problem: Pain:  Goal: Pain level will decrease  Description: Pain level will decrease  10/13/2021 1106 by Diaz Hoyos RN  Outcome: Ongoing  10/13/2021 0016 by Kelle Capone RN  Outcome: Ongoing  Note: Monitoring pain with each assessment and prn. JOSE E 0-10 pain scale utilized. Non-pharmacological measures to be encouraged prior to pharmacological measures. Pt. Jaw pain better with a decrease in swelling.  BP good with no episodes of syncope

## 2021-10-13 NOTE — PROGRESS NOTES
[facility].,   Section of Cardiology  Progress Note      Date:  10/13/2021  Patient: Kanu Hebert  Admission:  10/11/2021  4:46 AM  Admit DX: Syncope and collapse [R55]  Bradycardia [R00.1]  Syncope, cardiogenic [R55]  Age:  78 y.o., 1945                           LOS: 2 days     Reason for evaluation:   syncope      SUBJECTIVE:     The patient was seen and examined. Notes and labs reviewed. There were not complications over night. Patient's cardiac review of systems: negative. The patient is generally feeling unchanged. OBJECTIVE:    BP (!) 150/82   Pulse 78   Temp 98.6 °F (37 °C) (Oral)   Resp 18   Ht 6' 3\" (1.905 m)   Wt 184 lb 11.2 oz (83.8 kg)   SpO2 97%   BMI 23.09 kg/m²     Intake/Output Summary (Last 24 hours) at 10/13/2021 1243  Last data filed at 10/13/2021 1109  Gross per 24 hour   Intake 600 ml   Output 300 ml   Net 300 ml       EXAM:   CONSTITUTIONAL:  awake, alert, cooperative, no apparent distress, and appears stated age. HEENT: Normal jugular venous pulsations, no carotid bruits. Head is atraumatic, normocephalic. Eyes and oral mucosa are normal.  LUNGS: Good respiratory effort. No for increased work of breathing. On auscultation: clear to auscultation bilaterally  CARDIOVASCULAR:  Normal apical impulse, regular rate and rhythm, normal S1 and S2, no S3 or S4, and no murmur or rub noted. ABDOMEN: Soft, nontender, nondistended. Bowel sounds present. No masses or tenderness. SKIN: Warm and dry. EXTREMITIES: No lower extremity edema. Motor movement grossly intact. No cyanosis or clubbing.     Current Inpatient Medications:   aspirin  81 mg Oral Daily    clindamycin  300 mg Oral 4 times per day    NIFEdipine  30 mg Oral Daily    sodium chloride flush  5-40 mL IntraVENous 2 times per day    enoxaparin  40 mg SubCUTAneous Daily    [Held by provider] carvedilol  25 mg Oral BID WC    lisinopril  40 mg Oral Daily    atorvastatin  20 mg Oral Daily    nicotine  1 patch TransDERmal Daily       IV Infusions (if any):   sodium chloride         Diagnostics:   Telemetry: Sinus    Labs:   CBC:   Recent Labs     10/11/21  0450 10/12/21  0530   WBC 5.7 6.1   HGB 15.2 14.3   HCT 47.4 45.9    176     BMP:   Recent Labs     10/12/21  0530 10/13/21  0539    135   K 4.0 3.7   CO2 28 24   BUN 15 11   CREATININE 1.26* 0.87   LABGLOM 56* >60   GLUCOSE 104* 121*     BNP: No results for input(s): BNP in the last 72 hours. PT/INR:   Recent Labs     10/11/21  0450   PROTIME 10.4   INR 1.0     APTT:  Recent Labs     10/11/21  0450   APTT 21.3     CARDIAC ENZYMES:No results for input(s): CKTOTAL, CKMB, CKMBINDEX, TROPONINI in the last 72 hours. FASTING LIPID PANEL:No results found for: HDL, LDLDIRECT, LDLCALC, TRIG  LIVER PROFILE:  Recent Labs     10/11/21  0450   AST 15   ALT 10   LABALBU 4.0       ASSESSMENT:    Patient Active Problem List   Diagnosis    Syncope, cardiogenic    CAD (coronary artery disease)    Hyperlipemia    Hypertension    Mild malnutrition ([address]       PLAN:    1. Syncope  2. Cad sp stent  Patient had an episode of syncope. He has history of cad but structurally normal heart. Etiology of syncope is not clear at this time. Coreg was blamed for syncope but since he has been on this medication for years it doesn't make sense that it will cause such severe symptoms abruptly. I will recommend loop recorder implantation. He can be discharged today with outpatient follow up. Please see orders. Discussed with patient,wife  and nursing.     Helio Sood MD, MD